# Patient Record
Sex: FEMALE | Race: WHITE | NOT HISPANIC OR LATINO | Employment: OTHER | URBAN - METROPOLITAN AREA
[De-identification: names, ages, dates, MRNs, and addresses within clinical notes are randomized per-mention and may not be internally consistent; named-entity substitution may affect disease eponyms.]

---

## 2017-12-06 ENCOUNTER — HOSPITAL ENCOUNTER (INPATIENT)
Facility: HOSPITAL | Age: 77
LOS: 2 days | Discharge: HOME | DRG: 872 | End: 2017-12-08
Attending: EMERGENCY MEDICINE | Admitting: INTERNAL MEDICINE
Payer: MEDICARE

## 2017-12-06 DIAGNOSIS — K59.00 CONSTIPATION, UNSPECIFIED CONSTIPATION TYPE: ICD-10-CM

## 2017-12-06 DIAGNOSIS — R79.89 ELEVATED TROPONIN: ICD-10-CM

## 2017-12-06 DIAGNOSIS — N10 PYELONEPHRITIS, ACUTE: Primary | ICD-10-CM

## 2017-12-06 DIAGNOSIS — R10.32 LEFT LOWER QUADRANT PAIN: ICD-10-CM

## 2017-12-06 DIAGNOSIS — R07.9 CHEST PAIN: ICD-10-CM

## 2017-12-06 DIAGNOSIS — R10.9 ABDOMINAL PAIN: ICD-10-CM

## 2017-12-06 DIAGNOSIS — R11.15 INTRACTABLE CYCLICAL VOMITING WITH NAUSEA: ICD-10-CM

## 2017-12-06 LAB
ALBUMIN SERPL BCP-MCNC: 3.5 G/DL
ALP SERPL-CCNC: 66 U/L
ALT SERPL W/O P-5'-P-CCNC: 15 U/L
ANION GAP SERPL CALC-SCNC: 9 MMOL/L
AST SERPL-CCNC: 23 U/L
BACTERIA #/AREA URNS HPF: ABNORMAL /HPF
BASOPHILS NFR BLD: 0 %
BILIRUB SERPL-MCNC: 0.9 MG/DL
BILIRUB UR QL STRIP: NEGATIVE
BUN SERPL-MCNC: 16 MG/DL
CALCIUM SERPL-MCNC: 9.1 MG/DL
CHLORIDE SERPL-SCNC: 103 MMOL/L
CLARITY UR: CLEAR
CO2 SERPL-SCNC: 25 MMOL/L
COLOR UR: YELLOW
CREAT SERPL-MCNC: 0.8 MG/DL
DIFFERENTIAL METHOD: ABNORMAL
EOSINOPHIL NFR BLD: 1 %
ERYTHROCYTE [DISTWIDTH] IN BLOOD BY AUTOMATED COUNT: 13 %
EST. GFR  (AFRICAN AMERICAN): >60 ML/MIN/1.73 M^2
EST. GFR  (NON AFRICAN AMERICAN): >60 ML/MIN/1.73 M^2
GLUCOSE SERPL-MCNC: 151 MG/DL
GLUCOSE UR QL STRIP: NEGATIVE
HCT VFR BLD AUTO: 30.8 %
HGB BLD-MCNC: 10.6 G/DL
HGB UR QL STRIP: ABNORMAL
HYALINE CASTS #/AREA URNS LPF: 0 /LPF
KETONES UR QL STRIP: NEGATIVE
LACTATE SERPL-SCNC: 1.6 MMOL/L
LEUKOCYTE ESTERASE UR QL STRIP: NEGATIVE
LIPASE SERPL-CCNC: 18 U/L
LIPASE SERPL-CCNC: 18 U/L
LYMPHOCYTES NFR BLD: 6 %
MCH RBC QN AUTO: 31.1 PG
MCHC RBC AUTO-ENTMCNC: 34.4 G/DL
MCV RBC AUTO: 90 FL
METAMYELOCYTES NFR BLD MANUAL: 1 %
MICROSCOPIC COMMENT: ABNORMAL
MONOCYTES NFR BLD: 4 %
NEUTROPHILS NFR BLD: 87 %
NEUTS BAND NFR BLD MANUAL: 1 %
NITRITE UR QL STRIP: NEGATIVE
PH UR STRIP: 6 [PH] (ref 5–8)
PLATELET # BLD AUTO: 239 K/UL
PMV BLD AUTO: 10.5 FL
POCT GLUCOSE: 163 MG/DL (ref 70–110)
POTASSIUM SERPL-SCNC: 3.5 MMOL/L
PROT SERPL-MCNC: 7.1 G/DL
PROT UR QL STRIP: ABNORMAL
RBC # BLD AUTO: 3.41 M/UL
RBC #/AREA URNS HPF: 2 /HPF (ref 0–4)
SODIUM SERPL-SCNC: 137 MMOL/L
SP GR UR STRIP: 1.01 (ref 1–1.03)
SQUAMOUS #/AREA URNS HPF: 2 /HPF
TROPONIN I SERPL DL<=0.01 NG/ML-MCNC: 0.03 NG/ML
URN SPEC COLLECT METH UR: ABNORMAL
UROBILINOGEN UR STRIP-ACNC: NEGATIVE EU/DL
WBC # BLD AUTO: 19.79 K/UL
WBC #/AREA URNS HPF: 1 /HPF (ref 0–5)

## 2017-12-06 PROCEDURE — 81000 URINALYSIS NONAUTO W/SCOPE: CPT

## 2017-12-06 PROCEDURE — 85007 BL SMEAR W/DIFF WBC COUNT: CPT

## 2017-12-06 PROCEDURE — 80053 COMPREHEN METABOLIC PANEL: CPT

## 2017-12-06 PROCEDURE — 99285 EMERGENCY DEPT VISIT HI MDM: CPT | Mod: 25

## 2017-12-06 PROCEDURE — 93005 ELECTROCARDIOGRAM TRACING: CPT

## 2017-12-06 PROCEDURE — 63600175 PHARM REV CODE 636 W HCPCS: Performed by: EMERGENCY MEDICINE

## 2017-12-06 PROCEDURE — 25000003 PHARM REV CODE 250: Performed by: EMERGENCY MEDICINE

## 2017-12-06 PROCEDURE — 83690 ASSAY OF LIPASE: CPT | Mod: 91

## 2017-12-06 PROCEDURE — 96361 HYDRATE IV INFUSION ADD-ON: CPT

## 2017-12-06 PROCEDURE — 96365 THER/PROPH/DIAG IV INF INIT: CPT | Mod: 59

## 2017-12-06 PROCEDURE — 85027 COMPLETE CBC AUTOMATED: CPT

## 2017-12-06 PROCEDURE — 87040 BLOOD CULTURE FOR BACTERIA: CPT | Mod: 59

## 2017-12-06 PROCEDURE — 12000002 HC ACUTE/MED SURGE SEMI-PRIVATE ROOM

## 2017-12-06 PROCEDURE — 84484 ASSAY OF TROPONIN QUANT: CPT

## 2017-12-06 PROCEDURE — 87088 URINE BACTERIA CULTURE: CPT

## 2017-12-06 PROCEDURE — 83690 ASSAY OF LIPASE: CPT

## 2017-12-06 PROCEDURE — 87186 SC STD MICRODIL/AGAR DIL: CPT

## 2017-12-06 PROCEDURE — 83605 ASSAY OF LACTIC ACID: CPT

## 2017-12-06 PROCEDURE — 96375 TX/PRO/DX INJ NEW DRUG ADDON: CPT

## 2017-12-06 PROCEDURE — 87086 URINE CULTURE/COLONY COUNT: CPT

## 2017-12-06 PROCEDURE — 93010 ELECTROCARDIOGRAM REPORT: CPT | Mod: ,,, | Performed by: INTERNAL MEDICINE

## 2017-12-06 PROCEDURE — 25000003 PHARM REV CODE 250: Performed by: PHYSICIAN ASSISTANT

## 2017-12-06 PROCEDURE — 93010 ELECTROCARDIOGRAM REPORT: CPT | Mod: 76,,, | Performed by: INTERNAL MEDICINE

## 2017-12-06 PROCEDURE — 25500020 PHARM REV CODE 255: Performed by: EMERGENCY MEDICINE

## 2017-12-06 PROCEDURE — 87077 CULTURE AEROBIC IDENTIFY: CPT

## 2017-12-06 RX ORDER — SODIUM CHLORIDE 0.9 % (FLUSH) 0.9 %
3 SYRINGE (ML) INJECTION
Status: DISCONTINUED | OUTPATIENT
Start: 2017-12-06 | End: 2017-12-07

## 2017-12-06 RX ORDER — NAPROXEN SODIUM 220 MG/1
81 TABLET, FILM COATED ORAL
Status: COMPLETED | OUTPATIENT
Start: 2017-12-06 | End: 2017-12-06

## 2017-12-06 RX ORDER — ACETAMINOPHEN 650 MG/1
650 SUPPOSITORY RECTAL
Status: COMPLETED | OUTPATIENT
Start: 2017-12-06 | End: 2017-12-06

## 2017-12-06 RX ORDER — IBUPROFEN 200 MG
24 TABLET ORAL
Status: DISCONTINUED | OUTPATIENT
Start: 2017-12-06 | End: 2017-12-08 | Stop reason: HOSPADM

## 2017-12-06 RX ORDER — LOSARTAN POTASSIUM 25 MG/1
25 TABLET ORAL DAILY
COMMUNITY

## 2017-12-06 RX ORDER — SODIUM CHLORIDE 9 MG/ML
1000 INJECTION, SOLUTION INTRAVENOUS
Status: COMPLETED | OUTPATIENT
Start: 2017-12-06 | End: 2017-12-06

## 2017-12-06 RX ORDER — CARVEDILOL 3.12 MG/1
3.12 TABLET ORAL 2 TIMES DAILY WITH MEALS
COMMUNITY

## 2017-12-06 RX ORDER — ONDANSETRON 4 MG/1
4 TABLET, ORALLY DISINTEGRATING ORAL
Status: COMPLETED | OUTPATIENT
Start: 2017-12-06 | End: 2017-12-06

## 2017-12-06 RX ORDER — MORPHINE SULFATE 8 MG/ML
2 INJECTION INTRAMUSCULAR; INTRAVENOUS; SUBCUTANEOUS
Status: COMPLETED | OUTPATIENT
Start: 2017-12-06 | End: 2017-12-06

## 2017-12-06 RX ORDER — SODIUM CHLORIDE 9 MG/ML
INJECTION, SOLUTION INTRAVENOUS CONTINUOUS
Status: DISCONTINUED | OUTPATIENT
Start: 2017-12-06 | End: 2017-12-07

## 2017-12-06 RX ORDER — ONDANSETRON 2 MG/ML
4 INJECTION INTRAMUSCULAR; INTRAVENOUS EVERY 8 HOURS PRN
Status: DISCONTINUED | OUTPATIENT
Start: 2017-12-06 | End: 2017-12-07

## 2017-12-06 RX ORDER — IBUPROFEN 600 MG/1
600 TABLET ORAL EVERY 6 HOURS PRN
Status: DISCONTINUED | OUTPATIENT
Start: 2017-12-06 | End: 2017-12-07

## 2017-12-06 RX ORDER — ATORVASTATIN CALCIUM 20 MG/1
20 TABLET, FILM COATED ORAL DAILY
COMMUNITY

## 2017-12-06 RX ORDER — GLUCAGON 1 MG
1 KIT INJECTION
Status: DISCONTINUED | OUTPATIENT
Start: 2017-12-06 | End: 2017-12-08 | Stop reason: HOSPADM

## 2017-12-06 RX ORDER — ONDANSETRON 2 MG/ML
4 INJECTION INTRAMUSCULAR; INTRAVENOUS
Status: COMPLETED | OUTPATIENT
Start: 2017-12-06 | End: 2017-12-06

## 2017-12-06 RX ORDER — IBUPROFEN 200 MG
16 TABLET ORAL
Status: DISCONTINUED | OUTPATIENT
Start: 2017-12-06 | End: 2017-12-08 | Stop reason: HOSPADM

## 2017-12-06 RX ORDER — ASPIRIN 81 MG/1
81 TABLET ORAL DAILY
COMMUNITY

## 2017-12-06 RX ORDER — ASPIRIN 81 MG/1
81 TABLET ORAL DAILY
Status: DISCONTINUED | OUTPATIENT
Start: 2017-12-07 | End: 2017-12-08 | Stop reason: HOSPADM

## 2017-12-06 RX ORDER — HYDROGEN PEROXIDE 3 %
20 SOLUTION, NON-ORAL MISCELLANEOUS
COMMUNITY

## 2017-12-06 RX ADMIN — CEFTRIAXONE 1 G: 1 INJECTION, SOLUTION INTRAVENOUS at 09:12

## 2017-12-06 RX ADMIN — MORPHINE SULFATE 2 MG: 8 INJECTION, SOLUTION INTRAMUSCULAR; INTRAVENOUS at 06:12

## 2017-12-06 RX ADMIN — IOHEXOL 75 ML: 350 INJECTION, SOLUTION INTRAVENOUS at 06:12

## 2017-12-06 RX ADMIN — SODIUM CHLORIDE 1000 ML: 0.9 INJECTION, SOLUTION INTRAVENOUS at 05:12

## 2017-12-06 RX ADMIN — NITROGLYCERIN 0.5 INCH: 20 OINTMENT TOPICAL at 09:12

## 2017-12-06 RX ADMIN — IOHEXOL 15 ML: 300 INJECTION, SOLUTION INTRAVENOUS at 06:12

## 2017-12-06 RX ADMIN — ONDANSETRON 4 MG: 4 TABLET, ORALLY DISINTEGRATING ORAL at 03:12

## 2017-12-06 RX ADMIN — ONDANSETRON 4 MG: 2 INJECTION INTRAMUSCULAR; INTRAVENOUS at 05:12

## 2017-12-06 RX ADMIN — PIPERACILLIN AND TAZOBACTAM 4.5 G: 4; .5 INJECTION, POWDER, LYOPHILIZED, FOR SOLUTION INTRAVENOUS; PARENTERAL at 06:12

## 2017-12-06 RX ADMIN — SODIUM CHLORIDE: 0.9 INJECTION, SOLUTION INTRAVENOUS at 10:12

## 2017-12-06 RX ADMIN — ASPIRIN 81 MG 81 MG: 81 TABLET ORAL at 08:12

## 2017-12-06 RX ADMIN — ACETAMINOPHEN 650 MG: 650 SUPPOSITORY RECTAL at 06:12

## 2017-12-06 NOTE — ED TRIAGE NOTES
Pt comes in complaining of abdominal pain, nausea, emesis which started this afternoon. Pt report intermittent general weakness x 1 wk. Pt reports last normal BM this morning. Pt has hx of cardiac stents and DM.

## 2017-12-06 NOTE — ED PROVIDER NOTES
Encounter Date: 12/6/2017    SCRIBE #1 NOTE: I, Ned Garcia, am scribing for, and in the presence of,  Alonso Guerrero MD. I have scribed the following portions of the note - Other sections scribed: HPI/ROS/PE.       History     Chief Complaint   Patient presents with    Abdominal Pain     abdominal pain with nausea and vomitting; general weakness; symptoms this morning; EMS reports     Fatigue     CC: Fatigue/Generalized Weakness    HPI: This 77 y.o. Female with a medical history of diabetes mellitus, hyperlipidemia, and HTN presents to the ED accompanied by daughter for an evaluation of acute onset fatigue/generalized weakness since 11:00 AM this morning. Per daughter, around 2:30 PM today, patient began to experience nausea and vomiting along with abdominal pain. No abdominal surgeries, but patient did have abdominal complications in the past. Patient has a stent in place. No alleviating or exacerbating factors. Otherwise, daughter denies fever, chills, and numbness.      The history is provided by a relative. No  was used.     Review of patient's allergies indicates:  No Known Allergies  Past Medical History:   Diagnosis Date    Diabetes mellitus     Hyperlipidemia     Hypertension      Past Surgical History:   Procedure Laterality Date    cardiac stents       History reviewed. No pertinent family history.  Social History   Substance Use Topics    Smoking status: Never Smoker    Smokeless tobacco: Never Used    Alcohol use No     Review of Systems   Constitutional: Positive for fatigue. Negative for chills and fever.   HENT: Negative for congestion, ear pain, rhinorrhea and sore throat.    Eyes: Negative for pain and visual disturbance.   Respiratory: Negative for cough and shortness of breath.    Cardiovascular: Negative for chest pain.   Gastrointestinal: Positive for abdominal pain, nausea and vomiting. Negative for diarrhea.   Genitourinary: Negative for dysuria.    Musculoskeletal: Negative for back pain and neck pain.   Skin: Negative for rash.   Neurological: Positive for weakness (generalized). Negative for numbness and headaches.       Physical Exam     Initial Vitals [12/06/17 1519]   BP Pulse Resp Temp SpO2   (!) 174/87 (!) 122 18 99.5 °F (37.5 °C) (!) 94 %      MAP       116         Physical Exam    Nursing note and vitals reviewed.  Constitutional: She appears well-developed and well-nourished.  Non-toxic appearance. She does not appear ill.   HENT:   Head: Normocephalic and atraumatic.   Eyes: EOM are normal.   Neck: Neck supple.   Cardiovascular: Normal rate and regular rhythm.   Pulmonary/Chest: Effort normal and breath sounds normal. No respiratory distress.   Abdominal: Soft. Normal appearance and bowel sounds are normal. She exhibits no distension. There is tenderness (LLQ). There is guarding (mild).   Musculoskeletal: Normal range of motion.   Neurological: She is alert.   Skin: Skin is warm and dry.   Psychiatric: She has a normal mood and affect.         ED Course   Procedures  Labs Reviewed   CBC W/ AUTO DIFFERENTIAL - Abnormal; Notable for the following:        Result Value    WBC 19.79 (*)     RBC 3.41 (*)     Hemoglobin 10.6 (*)     Hematocrit 30.8 (*)     MCH 31.1 (*)     Gran% 87.0 (*)     Lymph% 6.0 (*)     All other components within normal limits   COMPREHENSIVE METABOLIC PANEL - Abnormal; Notable for the following:     Glucose 151 (*)     All other components within normal limits   URINALYSIS - Abnormal; Notable for the following:     Protein, UA 1+ (*)     Occult Blood UA 1+ (*)     All other components within normal limits   TROPONIN I - Abnormal; Notable for the following:     Troponin I 0.033 (*)     All other components within normal limits   URINALYSIS MICROSCOPIC - Abnormal; Notable for the following:     Bacteria, UA Many (*)     All other components within normal limits   POCT GLUCOSE - Abnormal; Notable for the following:     POCT Glucose  163 (*)     All other components within normal limits   CULTURE, BLOOD   CULTURE, BLOOD   CULTURE, URINE   LIPASE   LIPASE   LACTIC ACID, PLASMA   URINALYSIS   URINALYSIS   POCT GLUCOSE MONITORING CONTINUOUS   POCT GLUCOSE MONITORING CONTINUOUS     EKG Readings: (Independently Interpreted)   Initial Reading: No STEMI. Rhythm: Sinus Tachycardia. Heart Rate: 112. ST Segments: Non-Specific ST Segment Depression.     ECG Results          EKG 12-lead (Final result)  Result time 12/06/17 17:24:18    Final result by Interface, Lab In [facility] (12/06/17 17:24:18)                 Narrative:    Test Reason : R10.9  Blood Pressure : 139/62mmHG  Vent. Rate : 115 BPM     Atrial Rate : 115 BPM     P-R Int : 140 ms          QRS Dur : 084 ms      QT Int : 292 ms       P-R-T Axes : 073 038 035 degrees     QTc Int : 403 ms    Sinus tachycardia with occasional Premature ventricular complexes  Nonspecific ST and T wave abnormality  Abnormal ECG  No previous ECGs available  Confirmed by Uri Gayle MD ([id number]) on 12/6/2017 5:24:12 PM    Referred By: GUSTAVO CHACON           Confirmed By:Uri Gayle MD                                          Scribe Attestation:   Scribe #1: I performed the above scribed service and the documentation accurately describes the services I performed. I attest to the accuracy of the note.    Attending Attestation:           Physician Attestation for Scribe:  Physician Attestation Statement for Scribe #1: I, Alonso Guerrero MD, reviewed documentation, as scribed by Ned Garcia in my presence, and it is both accurate and complete.                 ED Course    MDM Pt with decreased pain, now afebrile, tolerating pos now. Denies chest pain. CT indicates left pyelonephrits, though urinalyis is clean, pt does have left flank and left lower abd pain, more consistant with divereticulitis. Will continue on broad spectrum antibiotics. Elevated troponin is slight and not related to subj complaints or ekg changes,  most likely related to tachycardic stress with treat with ASA only, unless admit physician disagrees.  Clinical Impression:   The primary encounter diagnosis was Pyelonephritis, acute. Diagnoses of Abdominal pain, Chest pain, Left lower quadrant pain, Constipation, unspecified constipation type, Intractable cyclical vomiting with nausea, Elevated troponin, and Elevated troponin were also pertinent to this visit.                           Alonso Guerrero MD  12/06/17 1946

## 2017-12-07 PROBLEM — I10 ESSENTIAL HYPERTENSION: Chronic | Status: ACTIVE | Noted: 2017-12-07

## 2017-12-07 PROBLEM — I25.10 CAD (CORONARY ARTERY DISEASE): Chronic | Status: ACTIVE | Noted: 2017-12-07

## 2017-12-07 PROBLEM — A41.9 SEPSIS: Status: ACTIVE | Noted: 2017-12-07

## 2017-12-07 PROBLEM — R65.20 SEVERE SEPSIS: Status: ACTIVE | Noted: 2017-12-07

## 2017-12-07 PROBLEM — K21.9 GERD (GASTROESOPHAGEAL REFLUX DISEASE): Chronic | Status: ACTIVE | Noted: 2017-12-07

## 2017-12-07 PROBLEM — E11.9 TYPE 2 DIABETES MELLITUS: Chronic | Status: ACTIVE | Noted: 2017-12-07

## 2017-12-07 PROBLEM — E78.5 HYPERLIPIDEMIA: Chronic | Status: ACTIVE | Noted: 2017-12-07

## 2017-12-07 PROBLEM — R07.9 ACUTE CHEST PAIN: Status: ACTIVE | Noted: 2017-12-07

## 2017-12-07 PROBLEM — D63.8 ANEMIA OF CHRONIC DISEASE: Chronic | Status: ACTIVE | Noted: 2017-12-07

## 2017-12-07 LAB
ALBUMIN SERPL BCP-MCNC: 2.9 G/DL
ALP SERPL-CCNC: 57 U/L
ALT SERPL W/O P-5'-P-CCNC: 15 U/L
ANION GAP SERPL CALC-SCNC: 11 MMOL/L
AST SERPL-CCNC: 20 U/L
BASOPHILS # BLD AUTO: 0.04 K/UL
BASOPHILS NFR BLD: 0.2 %
BILIRUB SERPL-MCNC: 0.7 MG/DL
BUN SERPL-MCNC: 11 MG/DL
CALCIUM SERPL-MCNC: 8.2 MG/DL
CHLORIDE SERPL-SCNC: 108 MMOL/L
CO2 SERPL-SCNC: 20 MMOL/L
CREAT SERPL-MCNC: 0.8 MG/DL
DIASTOLIC DYSFUNCTION: YES
DIFFERENTIAL METHOD: ABNORMAL
EOSINOPHIL # BLD AUTO: 0.1 K/UL
EOSINOPHIL NFR BLD: 0.3 %
ERYTHROCYTE [DISTWIDTH] IN BLOOD BY AUTOMATED COUNT: 13.4 %
EST. GFR  (AFRICAN AMERICAN): >60 ML/MIN/1.73 M^2
EST. GFR  (NON AFRICAN AMERICAN): >60 ML/MIN/1.73 M^2
ESTIMATED AVG GLUCOSE: 128 MG/DL
ESTIMATED PA SYSTOLIC PRESSURE: 32.81
GLOBAL PERICARDIAL EFFUSION: ABNORMAL
GLUCOSE SERPL-MCNC: 112 MG/DL
HBA1C MFR BLD HPLC: 6.1 %
HCT VFR BLD AUTO: 27.8 %
HGB BLD-MCNC: 9.3 G/DL
LYMPHOCYTES # BLD AUTO: 2.7 K/UL
LYMPHOCYTES NFR BLD: 11.7 %
MAGNESIUM SERPL-MCNC: 1.4 MG/DL
MCH RBC QN AUTO: 30.6 PG
MCHC RBC AUTO-ENTMCNC: 33.5 G/DL
MCV RBC AUTO: 91 FL
MITRAL VALVE REGURGITATION: ABNORMAL
MONOCYTES # BLD AUTO: 2.1 K/UL
MONOCYTES NFR BLD: 9.1 %
NEUTROPHILS # BLD AUTO: 17.8 K/UL
NEUTROPHILS NFR BLD: 79.1 %
PHOSPHATE SERPL-MCNC: 2.7 MG/DL
PLATELET # BLD AUTO: 178 K/UL
PMV BLD AUTO: 11.8 FL
POCT GLUCOSE: 139 MG/DL (ref 70–110)
POCT GLUCOSE: 141 MG/DL (ref 70–110)
POCT GLUCOSE: 143 MG/DL (ref 70–110)
POCT GLUCOSE: 169 MG/DL (ref 70–110)
POCT GLUCOSE: 188 MG/DL (ref 70–110)
POCT GLUCOSE: 230 MG/DL (ref 70–110)
POTASSIUM SERPL-SCNC: 4 MMOL/L
PROT SERPL-MCNC: 6.3 G/DL
RBC # BLD AUTO: 3.04 M/UL
RETIRED EF AND QEF - SEE NOTES: 55 (ref 55–65)
SODIUM SERPL-SCNC: 139 MMOL/L
TRICUSPID VALVE REGURGITATION: ABNORMAL
TROPONIN I SERPL DL<=0.01 NG/ML-MCNC: 0.03 NG/ML
TROPONIN I SERPL DL<=0.01 NG/ML-MCNC: 0.03 NG/ML
TROPONIN I SERPL DL<=0.01 NG/ML-MCNC: 0.04 NG/ML
WBC # BLD AUTO: 22.59 K/UL

## 2017-12-07 PROCEDURE — 25000003 PHARM REV CODE 250: Performed by: INTERNAL MEDICINE

## 2017-12-07 PROCEDURE — 85025 COMPLETE CBC W/AUTO DIFF WBC: CPT

## 2017-12-07 PROCEDURE — 36415 COLL VENOUS BLD VENIPUNCTURE: CPT

## 2017-12-07 PROCEDURE — 93306 TTE W/DOPPLER COMPLETE: CPT | Mod: 26,,, | Performed by: INTERNAL MEDICINE

## 2017-12-07 PROCEDURE — 63600175 PHARM REV CODE 636 W HCPCS: Performed by: EMERGENCY MEDICINE

## 2017-12-07 PROCEDURE — 12000002 HC ACUTE/MED SURGE SEMI-PRIVATE ROOM

## 2017-12-07 PROCEDURE — 84484 ASSAY OF TROPONIN QUANT: CPT | Mod: 91

## 2017-12-07 PROCEDURE — 25000003 PHARM REV CODE 250: Performed by: HOSPITALIST

## 2017-12-07 PROCEDURE — 84484 ASSAY OF TROPONIN QUANT: CPT

## 2017-12-07 PROCEDURE — 99223 1ST HOSP IP/OBS HIGH 75: CPT | Mod: ,,, | Performed by: INTERNAL MEDICINE

## 2017-12-07 PROCEDURE — 80053 COMPREHEN METABOLIC PANEL: CPT

## 2017-12-07 PROCEDURE — 83735 ASSAY OF MAGNESIUM: CPT

## 2017-12-07 PROCEDURE — 84100 ASSAY OF PHOSPHORUS: CPT

## 2017-12-07 PROCEDURE — 93306 TTE W/DOPPLER COMPLETE: CPT

## 2017-12-07 PROCEDURE — 83036 HEMOGLOBIN GLYCOSYLATED A1C: CPT

## 2017-12-07 PROCEDURE — 25000003 PHARM REV CODE 250: Performed by: EMERGENCY MEDICINE

## 2017-12-07 PROCEDURE — 63600175 PHARM REV CODE 636 W HCPCS: Performed by: INTERNAL MEDICINE

## 2017-12-07 RX ORDER — ATORVASTATIN CALCIUM 10 MG/1
20 TABLET, FILM COATED ORAL DAILY
Status: DISCONTINUED | OUTPATIENT
Start: 2017-12-07 | End: 2017-12-08 | Stop reason: HOSPADM

## 2017-12-07 RX ORDER — RAMELTEON 8 MG/1
8 TABLET ORAL NIGHTLY PRN
Status: DISCONTINUED | OUTPATIENT
Start: 2017-12-07 | End: 2017-12-08 | Stop reason: HOSPADM

## 2017-12-07 RX ORDER — TRAMADOL HYDROCHLORIDE 50 MG/1
50 TABLET ORAL EVERY 6 HOURS PRN
Status: DISCONTINUED | OUTPATIENT
Start: 2017-12-07 | End: 2017-12-08 | Stop reason: HOSPADM

## 2017-12-07 RX ORDER — CARVEDILOL 6.25 MG/1
25 TABLET ORAL 2 TIMES DAILY WITH MEALS
Status: DISCONTINUED | OUTPATIENT
Start: 2017-12-07 | End: 2017-12-08

## 2017-12-07 RX ORDER — ACETAMINOPHEN 500 MG
500 TABLET ORAL EVERY 6 HOURS PRN
Status: DISCONTINUED | OUTPATIENT
Start: 2017-12-07 | End: 2017-12-08 | Stop reason: HOSPADM

## 2017-12-07 RX ORDER — PANTOPRAZOLE SODIUM 40 MG/1
40 TABLET, DELAYED RELEASE ORAL DAILY
Status: DISCONTINUED | OUTPATIENT
Start: 2017-12-07 | End: 2017-12-08 | Stop reason: HOSPADM

## 2017-12-07 RX ORDER — INSULIN ASPART 100 [IU]/ML
3 INJECTION, SOLUTION INTRAVENOUS; SUBCUTANEOUS
Status: DISCONTINUED | OUTPATIENT
Start: 2017-12-07 | End: 2017-12-08 | Stop reason: HOSPADM

## 2017-12-07 RX ORDER — CARVEDILOL 3.12 MG/1
3.12 TABLET ORAL 2 TIMES DAILY WITH MEALS
Status: DISCONTINUED | OUTPATIENT
Start: 2017-12-07 | End: 2017-12-07

## 2017-12-07 RX ORDER — ONDANSETRON 2 MG/ML
8 INJECTION INTRAMUSCULAR; INTRAVENOUS EVERY 8 HOURS PRN
Status: DISCONTINUED | OUTPATIENT
Start: 2017-12-07 | End: 2017-12-08 | Stop reason: HOSPADM

## 2017-12-07 RX ORDER — NITROGLYCERIN 0.4 MG/1
0.4 TABLET SUBLINGUAL EVERY 5 MIN PRN
Status: DISCONTINUED | OUTPATIENT
Start: 2017-12-07 | End: 2017-12-08 | Stop reason: HOSPADM

## 2017-12-07 RX ORDER — CLONIDINE HYDROCHLORIDE 0.1 MG/1
0.1 TABLET ORAL 3 TIMES DAILY PRN
Status: DISCONTINUED | OUTPATIENT
Start: 2017-12-07 | End: 2017-12-08 | Stop reason: HOSPADM

## 2017-12-07 RX ORDER — MORPHINE SULFATE 8 MG/ML
2 INJECTION INTRAMUSCULAR; INTRAVENOUS; SUBCUTANEOUS EVERY 4 HOURS PRN
Status: DISCONTINUED | OUTPATIENT
Start: 2017-12-07 | End: 2017-12-08 | Stop reason: HOSPADM

## 2017-12-07 RX ORDER — PROCHLORPERAZINE EDISYLATE 5 MG/ML
5 INJECTION INTRAMUSCULAR; INTRAVENOUS EVERY 6 HOURS PRN
Status: DISCONTINUED | OUTPATIENT
Start: 2017-12-07 | End: 2017-12-08 | Stop reason: HOSPADM

## 2017-12-07 RX ORDER — LOSARTAN POTASSIUM 25 MG/1
25 TABLET ORAL DAILY
Status: DISCONTINUED | OUTPATIENT
Start: 2017-12-07 | End: 2017-12-08 | Stop reason: HOSPADM

## 2017-12-07 RX ORDER — SODIUM CHLORIDE 9 MG/ML
INJECTION, SOLUTION INTRAVENOUS CONTINUOUS
Status: ACTIVE | OUTPATIENT
Start: 2017-12-07 | End: 2017-12-08

## 2017-12-07 RX ORDER — TRAMADOL HYDROCHLORIDE 50 MG/1
50 TABLET ORAL EVERY 6 HOURS PRN
Status: DISCONTINUED | OUTPATIENT
Start: 2017-12-07 | End: 2017-12-07

## 2017-12-07 RX ADMIN — SODIUM CHLORIDE: 0.9 INJECTION, SOLUTION INTRAVENOUS at 03:12

## 2017-12-07 RX ADMIN — INSULIN ASPART 3 UNITS: 100 INJECTION, SOLUTION INTRAVENOUS; SUBCUTANEOUS at 08:12

## 2017-12-07 RX ADMIN — SODIUM CHLORIDE: 0.9 INJECTION, SOLUTION INTRAVENOUS at 06:12

## 2017-12-07 RX ADMIN — CEFTRIAXONE 1 G: 1 INJECTION, SOLUTION INTRAVENOUS at 09:12

## 2017-12-07 RX ADMIN — INSULIN ASPART 3 UNITS: 100 INJECTION, SOLUTION INTRAVENOUS; SUBCUTANEOUS at 01:12

## 2017-12-07 RX ADMIN — ASPIRIN 81 MG: 81 TABLET, COATED ORAL at 08:12

## 2017-12-07 RX ADMIN — NITROGLYCERIN 0.4 MG: 0.4 TABLET SUBLINGUAL at 10:12

## 2017-12-07 RX ADMIN — CARVEDILOL 25 MG: 6.25 TABLET, FILM COATED ORAL at 09:12

## 2017-12-07 RX ADMIN — ACETAMINOPHEN 500 MG: 500 TABLET ORAL at 11:12

## 2017-12-07 RX ADMIN — LOSARTAN POTASSIUM 25 MG: 25 TABLET, FILM COATED ORAL at 08:12

## 2017-12-07 RX ADMIN — MORPHINE SULFATE 2 MG: 8 INJECTION, SOLUTION INTRAMUSCULAR; INTRAVENOUS at 10:12

## 2017-12-07 RX ADMIN — INSULIN ASPART 3 UNITS: 100 INJECTION, SOLUTION INTRAVENOUS; SUBCUTANEOUS at 06:12

## 2017-12-07 RX ADMIN — TRAMADOL HYDROCHLORIDE 50 MG: 50 TABLET, FILM COATED ORAL at 06:12

## 2017-12-07 RX ADMIN — ACETAMINOPHEN 500 MG: 500 TABLET ORAL at 04:12

## 2017-12-07 RX ADMIN — ATORVASTATIN CALCIUM 20 MG: 10 TABLET, FILM COATED ORAL at 08:12

## 2017-12-07 RX ADMIN — INSULIN DETEMIR 10 UNITS: 100 INJECTION, SOLUTION SUBCUTANEOUS at 09:12

## 2017-12-07 RX ADMIN — CARVEDILOL 3.12 MG: 3.12 TABLET, FILM COATED ORAL at 08:12

## 2017-12-07 RX ADMIN — CARVEDILOL 3.12 MG: 3.12 TABLET, FILM COATED ORAL at 06:12

## 2017-12-07 RX ADMIN — PANTOPRAZOLE SODIUM 40 MG: 40 TABLET, DELAYED RELEASE ORAL at 08:12

## 2017-12-07 NOTE — ASSESSMENT & PLAN NOTE
Patient's blood pressure is well-controlled; will continue home regimen of carvedilol and losartan, and provide as-needed clonidine.

## 2017-12-07 NOTE — CARE UPDATE
Reviewed H and P by my colleague and agree with A and P.  Patient found to have L sided pyelonephritis.  Started on Abx Blood cultures negative.  Urine culture pending.  Follow troponins for CP. All labs and vitals reviewed.

## 2017-12-07 NOTE — HPI
Ms. Navjot Gonzalez is a 77 y.o. female with essential hypertension, hyperlipidemia (LDL unknown), type 2 diabetes mellitus (HbA1c unknown), CAD, anemia of chronic disease, and GERD who presents to [facility] ED with complaints of abdominal pain this morning.  He describes the pain as left-lower abdominal quadrant without radiation and is associated with increased urinary frequency and pain.  She denies any fevers or chills.  Her appetite has been poor.    She does, however, complain of chest pain that is midsternal without radiation.  The pain is 5/10 in severity and is associated with increasing fatigue, shortness of breath, and dizziness.  She does report a history of 2-vessel coronary artery disease but it is unclear if she has ever had any intervention performed.  She was previously receiving her medical care in [place], [place].

## 2017-12-07 NOTE — NURSING
Called to patient's room due to complaints of chest pain. BP: 133/60 HR: 96. Administered to rounds of Nitroglycerin. Pain subsided. Call placed to Dr. Mckinnon to inform him of above. New orders given as follows:    Consult to Cardiology

## 2017-12-07 NOTE — ASSESSMENT & PLAN NOTE
Her story of chest pain is concerning especially given her reported history minimally elevated troponin.  She does complain of pain right now.  Will provide nitroglycerin, place on cardiac monitoring, and obtain serial cardiac markers.  I have reviewed the EKG and it reveals sinus tachycardia without evidence of acute ischemia.  Her chest pain and troponinemia could very well be due to sepsis, but will err on the side of caution.

## 2017-12-07 NOTE — PLAN OF CARE
Problem: Patient Care Overview  Goal: Plan of Care Review  Outcome: Ongoing (interventions implemented as appropriate)  Pt remains free of falls and injuries. Assisted with bed pan as needed. Remains on tele, no issues. IVFs cont as scheduled. Rocephin Q24H. BG WNL. Pain managed with pain meds as needed, states chest pain has lessened. Currently resting appropriately.

## 2017-12-07 NOTE — CONSULTS
[facility]  Cardiology  Consult Note    Patient Name: Navjot Gonzalez  MRN: [id number]  Admission Date: 12/6/2017  Hospital Length of Stay: 1 days  Code Status: Full Code   Attending Provider: Sj Mckinnon MD   Consulting Provider: Uri Glover MD  Primary Care Physician: Provider [facility]  Principal Problem:Pyelonephritis, acute    Patient information was obtained from patient, past medical records and ER records.     Inpatient consult to Cardiology  Consult performed by: URI GLOVER  Consult ordered by: SJ MCKINNON  Reason for consult: Chest pain, history of CAD        Subjective:     Chief Complaint:  Chest pain     HPI:   77 y.o. Female with a medical history of diabetes mellitus, hyperlipidemia, and HTN presents to the ED accompanied by daughter for an evaluation of acute onset fatigue/generalized weakness since 11:00 AM this morning. Per daughter, around 2:30 PM today, patient began to experience nausea and vomiting along with abdominal pain. No abdominal surgeries, but patient did have abdominal complications in the past. Patient has a stent in place. No alleviating or exacerbating factors. Otherwise, daughter denies fever, chills, and numbness.    Admitted for UTI/pyelonephritis.  Patient complained of some mild substernal chest pain.  She's not had on and off symptoms mostly occurs during this admission.  She denies any other associated symptoms.  She's not experience any PND, orthopnea or lower edema.  She denies any dizziness, presyncope or syncope.  Most of the history was obtained per relative who is .  She is in town visiting from [place] where she has had all her work done.  She mostly follows up with primary care physician per family members translating.  She's not any recent testing in particular stress or echo.  Currently she is chest pain-free.    Past Medical History:   Diagnosis Date    Diabetes mellitus     Hyperlipidemia     Hypertension         Past Surgical History:   Procedure Laterality Date    cardiac stents         Review of patient's allergies indicates:  No Known Allergies    No current facility-administered medications on file prior to encounter.      No current outpatient prescriptions on file prior to encounter.     Family History     None        Social History Main Topics    Smoking status: Never Smoker    Smokeless tobacco: Never Used    Alcohol use No    Drug use: Unknown    Sexual activity: Not on file     Review of Systems   Constitution: Negative.   HENT: Negative.    Eyes: Negative.    Cardiovascular: Positive for chest pain. Negative for dyspnea on exertion, irregular heartbeat, leg swelling, near-syncope, orthopnea, palpitations, paroxysmal nocturnal dyspnea and syncope.   Respiratory: Negative for shortness of breath.    Skin: Negative.    Gastrointestinal: Positive for nausea. Negative for abdominal pain, constipation and diarrhea.   Genitourinary: Positive for dysuria and flank pain.   Neurological: Negative.    Psychiatric/Behavioral: Negative.      Objective:     Vital Signs (Most Recent):  Temp: 98.8 °F (37.1 °C) (12/07/17 0816)  Pulse: 94 (12/07/17 0835)  Resp: 18 (12/07/17 0531)  BP: 135/64 (12/07/17 0835)  SpO2: 95 % (12/07/17 0816) Vital Signs (24h Range):  Temp:  [97.5 °F (36.4 °C)-100.3 °F (37.9 °C)] 98.8 °F (37.1 °C)  Pulse:  [] 94  Resp:  [18] 18  SpO2:  [94 %-100 %] 95 %  BP: (107-174)/(52-87) 135/64     Weight: 58 kg (127 lb 13.9 oz)  Body mass index is 27.67 kg/m².    SpO2: 95 %  O2 Device (Oxygen Therapy): room air      Intake/Output Summary (Last 24 hours) at 12/07/17 1131  Last data filed at 12/07/17 0600   Gross per 24 hour   Intake             1300 ml   Output                0 ml   Net             1300 ml       Lines/Drains/Airways     Peripheral Intravenous Line                 Peripheral IV - Single Lumen 12/06/17 1520 Left Hand less than 1 day         Peripheral IV - Single Lumen 12/06/17 3425  Right Antecubital less than 1 day                Physical Exam   Constitutional: She is oriented to person, place, and time. She appears well-developed and well-nourished.   HENT:   Head: Normocephalic and atraumatic.   Eyes: Conjunctivae and EOM are normal. Pupils are equal, round, and reactive to light.   Neck: Normal range of motion. Neck supple. No thyromegaly present.   Cardiovascular: Normal rate and regular rhythm.    No murmur heard.  Pulmonary/Chest: Effort normal and breath sounds normal. No respiratory distress.   Abdominal: Soft. Bowel sounds are normal.   Musculoskeletal: She exhibits no edema.   Neurological: She is alert and oriented to person, place, and time.   Skin: Skin is warm and dry.   Psychiatric: She has a normal mood and affect. Her behavior is normal.       Significant Labs:   CMP   Recent Labs  Lab 12/06/17  1541 12/07/17  0426    139   K 3.5 4.0    108   CO2 25 20*   * 112*   BUN 16 11   CREATININE 0.8 0.8   CALCIUM 9.1 8.2*   PROT 7.1 6.3   ALBUMIN 3.5 2.9*   BILITOT 0.9 0.7   ALKPHOS 66 57   AST 23 20   ALT 15 15   ANIONGAP 9 11   ESTGFRAFRICA >60 >60   EGFRNONAA >60 >60   , CBC   Recent Labs  Lab 12/06/17  1541 12/07/17  0426   WBC 19.79* 22.59*   HGB 10.6* 9.3*   HCT 30.8* 27.8*    178   , INR No results for input(s): INR, PROTIME in the last 48 hours., Lipid Panel No results for input(s): CHOL, HDL, LDLCALC, TRIG, CHOLHDL in the last 48 hours. and Troponin   Recent Labs  Lab 12/06/17  1541 12/07/17  0426   TROPONINI 0.033* 0.027*       Significant Imaging: EKG: Normal sinus rhythm     Assessment and Plan:     * Pyelonephritis, acute    On antibiotics per primary        Acute chest pain    Ruling out for ACS but initial troponin is negative with nonischemic EKG  Continue to follow troponin trend  Check echocardiogram  Consider ischemic workup in a.m. if rules out        CAD (coronary artery disease)    Prior history of PCI  Continue medicines for secondary  prevention        Essential hypertension             Hyperlipidemia    On statin        Type 2 diabetes mellitus    Per IM            VTE Risk Mitigation         Ordered     Medium Risk of VTE  Once      12/06/17 7170          Thank you for your consult. I will follow-up with patient. Please contact us if you have any additional questions.    Uri Gayle MD  Cardiology   [facility]

## 2017-12-07 NOTE — PROGRESS NOTES
SW attempted discharge planning assessment.  Patient stated that she does not speak English well.  Asked SW to speak with her daughter.  Susi Castillo LMSW, [facility], [facility]  12/7/2017

## 2017-12-07 NOTE — ASSESSMENT & PLAN NOTE
Patient is on a home regimen of metformin and a DPP-4 inhibitor; will provide basal-prandial insulin therapy along with insulin sliding scale.

## 2017-12-07 NOTE — PLAN OF CARE
Problem: Patient Care Overview  Goal: Plan of Care Review  Outcome: Ongoing (interventions implemented as appropriate)   12/07/17 3510   Coping/Psychosocial   Plan Of Care Reviewed With patient;daughter     Patient remained free from falls, trauma, and injuries throughout shift. Complaints of chest pain controlled with administration of nitroglycerin. No complaints of nausea or vomiting. Blood glucose controlled with scheduled insulin. IV fluids and medications administered as prescribed. Educated patient and family on being NPO after midnight for testing. No acute distress noted.

## 2017-12-07 NOTE — PROGRESS NOTES
Discharge planning assessment completed with assistance of patient's daughter, Isabel, via [employer]   # [id number] (Sj).  Patient's daughter reported that patient is here visiting for the holidays and will be here until after rob.  Prior to admit, patient lived in [place] with her son and was independent.  Patient's daughter did not know the name of patient's PCP.    She stated that patient see multiple physicians in [place].  Patient will discharge to daughter's home located at [address] [place] [address].  Preferred Pharmacy is [facility] [address].    Susi Castillo LMSW, [facility], CCM  12/7/2017

## 2017-12-07 NOTE — ASSESSMENT & PLAN NOTE
This patient meets criteria for sepsis given fever, tachycardia, leukocytosis, suspected pyelonephritis, and minimally-elevated troponin.  Blood and urine cultures are pending; will start IV fluid hydration and broad spectrum antibiotics.

## 2017-12-07 NOTE — NURSING
Pt arrived to [facility] floor via transport. Daughter was at bedside, assisted with admit questions. Able to speak and communicate with pt in pt's primary language--Jamaican. Skin intact. Assisted with bedpan. C/o 7/10 abd pain, tender upon palpation. IVFs initiated. Pt oriented to room, bed in low position, call light within reach. POC d/w pt and daughter. No other complaints at this time.

## 2017-12-07 NOTE — SUBJECTIVE & OBJECTIVE
Past Medical History:   Diagnosis Date    Diabetes mellitus     Hyperlipidemia     Hypertension        Past Surgical History:   Procedure Laterality Date    cardiac stents         Review of patient's allergies indicates:  No Known Allergies    No current facility-administered medications on file prior to encounter.      No current outpatient prescriptions on file prior to encounter.     Family History     None        Social History Main Topics    Smoking status: Never Smoker    Smokeless tobacco: Never Used    Alcohol use No    Drug use: Unknown    Sexual activity: Not on file     Review of Systems   Constitutional: Positive for activity change and appetite change. Negative for chills, diaphoresis, fatigue, fever and unexpected weight change.   HENT: Negative.    Eyes: Negative.    Respiratory: Negative for cough, chest tightness and shortness of breath.    Cardiovascular: Positive for chest pain. Negative for palpitations and leg swelling.   Gastrointestinal: Positive for abdominal pain. Negative for abdominal distention, blood in stool, constipation, diarrhea, nausea and vomiting.   Genitourinary: Positive for dysuria, frequency and urgency.   Musculoskeletal: Negative.    Skin: Negative.    Neurological: Negative for dizziness, seizures, syncope, weakness and light-headedness.   Psychiatric/Behavioral: Negative.      Objective:     Vital Signs (Most Recent):  Temp: 98.1 °F (36.7 °C) (12/06/17 2234)  Pulse: 105 (12/06/17 2234)  Resp: 18 (12/06/17 2234)  BP: (!) 148/67 (12/06/17 2234)  SpO2: 95 % (12/06/17 2234) Vital Signs (24h Range):  Temp:  [98.1 °F (36.7 °C)-100.3 °F (37.9 °C)] 98.1 °F (36.7 °C)  Pulse:  [102-122] 105  Resp:  [18] 18  SpO2:  [94 %-100 %] 95 %  BP: (138-174)/(63-87) 148/67     Weight: 58 kg (127 lb 13.9 oz)  Body mass index is 27.67 kg/m².    Physical Exam   Constitutional: She is oriented to person, place, and time. She appears well-developed and well-nourished. No distress.   HENT:    Head: Normocephalic and atraumatic.   Right Ear: External ear normal.   Left Ear: External ear normal.   Nose: Nose normal.   Eyes: Right eye exhibits no discharge. Left eye exhibits no discharge.   Neck: Normal range of motion.   Cardiovascular: Normal rate, regular rhythm, normal heart sounds and intact distal pulses.  Exam reveals no gallop and no friction rub.    No murmur heard.  Pulmonary/Chest: Effort normal and breath sounds normal. No respiratory distress. She has no wheezes. She has no rales. She exhibits no tenderness.   Abdominal: Soft. Bowel sounds are normal. She exhibits no distension. There is no tenderness. There is no rebound and no guarding.   Genitourinary:   Genitourinary Comments: Pungent odor of urine   Musculoskeletal: Normal range of motion. She exhibits no edema.   Neurological: She is alert and oriented to person, place, and time.   Skin: Skin is warm and dry. She is not diaphoretic. No erythema.   Psychiatric: She has a normal mood and affect. Her behavior is normal. Judgment and thought content normal.   Nursing note and vitals reviewed.          Significant Labs: All pertinent labs within the past 24 hours have been reviewed.    Significant Imaging: I have reviewed and interpreted all pertinent imaging results/findings within the past 24 hours.

## 2017-12-07 NOTE — ASSESSMENT & PLAN NOTE
As addressed above.  Will continue her home regimen of aspirin, atorvastatin, carvedilol, and losartan.

## 2017-12-07 NOTE — NURSING
Patient off unit to cardiology via wheelchair. No acute distress noted. Will continue to monitor.

## 2017-12-07 NOTE — SUBJECTIVE & OBJECTIVE
Past Medical History:   Diagnosis Date    Diabetes mellitus     Hyperlipidemia     Hypertension        Past Surgical History:   Procedure Laterality Date    cardiac stents         Review of patient's allergies indicates:  No Known Allergies    No current facility-administered medications on file prior to encounter.      No current outpatient prescriptions on file prior to encounter.     Family History     None        Social History Main Topics    Smoking status: Never Smoker    Smokeless tobacco: Never Used    Alcohol use No    Drug use: Unknown    Sexual activity: Not on file     Review of Systems   Constitution: Negative.   HENT: Negative.    Eyes: Negative.    Cardiovascular: Positive for chest pain. Negative for dyspnea on exertion, irregular heartbeat, leg swelling, near-syncope, orthopnea, palpitations, paroxysmal nocturnal dyspnea and syncope.   Respiratory: Negative for shortness of breath.    Skin: Negative.    Gastrointestinal: Positive for nausea. Negative for abdominal pain, constipation and diarrhea.   Genitourinary: Positive for dysuria and flank pain.   Neurological: Negative.    Psychiatric/Behavioral: Negative.      Objective:     Vital Signs (Most Recent):  Temp: 98.8 °F (37.1 °C) (12/07/17 0816)  Pulse: 94 (12/07/17 0835)  Resp: 18 (12/07/17 0531)  BP: 135/64 (12/07/17 0835)  SpO2: 95 % (12/07/17 0816) Vital Signs (24h Range):  Temp:  [97.5 °F (36.4 °C)-100.3 °F (37.9 °C)] 98.8 °F (37.1 °C)  Pulse:  [] 94  Resp:  [18] 18  SpO2:  [94 %-100 %] 95 %  BP: (107-174)/(52-87) 135/64     Weight: 58 kg (127 lb 13.9 oz)  Body mass index is 27.67 kg/m².    SpO2: 95 %  O2 Device (Oxygen Therapy): room air      Intake/Output Summary (Last 24 hours) at 12/07/17 1131  Last data filed at 12/07/17 0600   Gross per 24 hour   Intake             1300 ml   Output                0 ml   Net             1300 ml       Lines/Drains/Airways     Peripheral Intravenous Line                 Peripheral IV -  Single Lumen 12/06/17 1520 Left Hand less than 1 day         Peripheral IV - Single Lumen 12/06/17 1745 Right Antecubital less than 1 day                Physical Exam   Constitutional: She is oriented to person, place, and time. She appears well-developed and well-nourished.   HENT:   Head: Normocephalic and atraumatic.   Eyes: Conjunctivae and EOM are normal. Pupils are equal, round, and reactive to light.   Neck: Normal range of motion. Neck supple. No thyromegaly present.   Cardiovascular: Normal rate and regular rhythm.    No murmur heard.  Pulmonary/Chest: Effort normal and breath sounds normal. No respiratory distress.   Abdominal: Soft. Bowel sounds are normal.   Musculoskeletal: She exhibits no edema.   Neurological: She is alert and oriented to person, place, and time.   Skin: Skin is warm and dry.   Psychiatric: She has a normal mood and affect. Her behavior is normal.       Significant Labs:   CMP   Recent Labs  Lab 12/06/17  1541 12/07/17  0426    139   K 3.5 4.0    108   CO2 25 20*   * 112*   BUN 16 11   CREATININE 0.8 0.8   CALCIUM 9.1 8.2*   PROT 7.1 6.3   ALBUMIN 3.5 2.9*   BILITOT 0.9 0.7   ALKPHOS 66 57   AST 23 20   ALT 15 15   ANIONGAP 9 11   ESTGFRAFRICA >60 >60   EGFRNONAA >60 >60   , CBC   Recent Labs  Lab 12/06/17  1541 12/07/17  0426   WBC 19.79* 22.59*   HGB 10.6* 9.3*   HCT 30.8* 27.8*    178   , INR No results for input(s): INR, PROTIME in the last 48 hours., Lipid Panel No results for input(s): CHOL, HDL, LDLCALC, TRIG, CHOLHDL in the last 48 hours. and Troponin   Recent Labs  Lab 12/06/17  1541 12/07/17  0426   TROPONINI 0.033* 0.027*       Significant Imaging: EKG: Normal sinus rhythm

## 2017-12-07 NOTE — ASSESSMENT & PLAN NOTE
Ruling out for ACS but initial troponin is negative with nonischemic EKG  Continue to follow troponin trend  Check echocardiogram  Consider ischemic workup in a.m. if rules out

## 2017-12-07 NOTE — H&P
[facility] Hospital Medicine  History & Physical    Patient Name: Navjot Gonzalez  MRN: [id number]  Admission Date: 12/6/2017  Attending Physician: Sj Mckinnon MD   Primary Care Provider: Provider Notinsystem         Patient information was obtained from patient.     Subjective:     Principal Problem:Pyelonephritis, acute    Chief Complaint: Abdominal pain for one day.    HPI: Ms. Navjot Gonzalez is a 77 y.o. female with essential hypertension, hyperlipidemia (LDL unknown), type 2 diabetes mellitus (HbA1c unknown), CAD, anemia of chronic disease, and GERD who presents to [facility] ED with complaints of abdominal pain this morning.  He describes the pain as left-lower abdominal quadrant without radiation and is associated with increased urinary frequency and pain.  She denies any fevers or chills.  Her appetite has been poor.    She does, however, complain of chest pain that is midsternal without radiation.  The pain is 5/10 in severity and is associated with increasing fatigue, shortness of breath, and dizziness.  She does report a history of 2-vessel coronary artery disease but it is unclear if she has ever had any intervention performed.  She was previously receiving her medical care in [place], [place].      Chart Review:  Patient has not had any recent hospitalizations or outpatient clinic visits within the system.    Past Medical History:   Diagnosis Date    Diabetes mellitus     Hyperlipidemia     Hypertension        Past Surgical History:   Procedure Laterality Date    cardiac stents         Review of patient's allergies indicates:  No Known Allergies    No current facility-administered medications on file prior to encounter.      No current outpatient prescriptions on file prior to encounter.     Family History     None        Social History Main Topics    Smoking status: Never Smoker    Smokeless tobacco: Never Used    Alcohol use No    Drug use: Unknown    Sexual activity: Not on file      Review of Systems   Constitutional: Positive for activity change and appetite change. Negative for chills, diaphoresis, fatigue, fever and unexpected weight change.   HENT: Negative.    Eyes: Negative.    Respiratory: Negative for cough, chest tightness and shortness of breath.    Cardiovascular: Positive for chest pain. Negative for palpitations and leg swelling.   Gastrointestinal: Positive for abdominal pain. Negative for abdominal distention, blood in stool, constipation, diarrhea, nausea and vomiting.   Genitourinary: Positive for dysuria, frequency and urgency.   Musculoskeletal: Negative.    Skin: Negative.    Neurological: Negative for dizziness, seizures, syncope, weakness and light-headedness.   Psychiatric/Behavioral: Negative.      Objective:     Vital Signs (Most Recent):  Temp: 98.1 °F (36.7 °C) (12/06/17 2234)  Pulse: 105 (12/06/17 2234)  Resp: 18 (12/06/17 2234)  BP: (!) 148/67 (12/06/17 2234)  SpO2: 95 % (12/06/17 2234) Vital Signs (24h Range):  Temp:  [98.1 °F (36.7 °C)-100.3 °F (37.9 °C)] 98.1 °F (36.7 °C)  Pulse:  [102-122] 105  Resp:  [18] 18  SpO2:  [94 %-100 %] 95 %  BP: (138-174)/(63-87) 148/67     Weight: 58 kg (127 lb 13.9 oz)  Body mass index is 27.67 kg/m².    Physical Exam   Constitutional: She is oriented to person, place, and time. She appears well-developed and well-nourished. No distress.   HENT:   Head: Normocephalic and atraumatic.   Right Ear: External ear normal.   Left Ear: External ear normal.   Nose: Nose normal.   Eyes: Right eye exhibits no discharge. Left eye exhibits no discharge.   Neck: Normal range of motion.   Cardiovascular: Normal rate, regular rhythm, normal heart sounds and intact distal pulses.  Exam reveals no gallop and no friction rub.    No murmur heard.  Pulmonary/Chest: Effort normal and breath sounds normal. No respiratory distress. She has no wheezes. She has no rales. She exhibits no tenderness.   Abdominal: Soft. Bowel sounds are normal. She exhibits  no distension. There is no tenderness. There is no rebound and no guarding.   Genitourinary:   Genitourinary Comments: Pungent odor of urine   Musculoskeletal: Normal range of motion. She exhibits no edema.   Neurological: She is alert and oriented to person, place, and time.   Skin: Skin is warm and dry. She is not diaphoretic. No erythema.   Psychiatric: She has a normal mood and affect. Her behavior is normal. Judgment and thought content normal.   Nursing note and vitals reviewed.          Significant Labs: All pertinent labs within the past 24 hours have been reviewed.    Significant Imaging: I have reviewed and interpreted all pertinent imaging results/findings within the past 24 hours.    Assessment/Plan:     * Pyelonephritis, acute    Patient's left-sided flank pain was concerning for acute diverticulitis, but given that her CT-abd/pelvis was mor indicative of left-sided pyelonephritis, and her urinalysis significant only for significant bacteriuria, will start treatment for pyelonephritis.  She does meet criteria for severe sepsis.  Will start empiric antibiotics.        Severe sepsis    This patient meets criteria for sepsis given fever, tachycardia, leukocytosis, suspected pyelonephritis, and minimally-elevated troponin.  Blood and urine cultures are pending; will start IV fluid hydration and broad spectrum antibiotics.        Acute chest pain    Her story of chest pain is concerning especially given her reported history minimally elevated troponin.  She does complain of pain right now.  Will provide nitroglycerin, place on cardiac monitoring, and obtain serial cardiac markers.  I have reviewed the EKG and it reveals sinus tachycardia without evidence of acute ischemia.  Her chest pain and troponinemia could very well be due to sepsis, but will err on the side of caution.        Essential hypertension    Patient's blood pressure is well-controlled; will continue home regimen of carvedilol and losartan,  and provide as-needed clonidine.        Hyperlipidemia    Will continue her home regimen of atorvastatin.        Type 2 diabetes mellitus    Patient is on a home regimen of metformin and a DPP-4 inhibitor; will provide basal-prandial insulin therapy along with insulin sliding scale.        CAD (coronary artery disease)    As addressed above.  Will continue her home regimen of aspirin, atorvastatin, carvedilol, and losartan.        Anemia of chronic disease    Unfortunately, we have no previous labwork to which to compare her anemia, but she is without obvious signs of bleeding; there is no indication for transfusion at this time.  Will continue to monitor.        GERD (gastroesophageal reflux disease)    Will substitute her home regimen of esomeprazole for pantoprazole while she is inpatient.          VTE Risk Mitigation         Ordered     Medium Risk of VTE  Once      12/06/17 2564             Total time spent on case: 45 minutes.        Myesha Mccord M.D.  Staff Nocturnist  Department of Hospital Medicine  [facility]  Pager: [phone]

## 2017-12-07 NOTE — HPI
77 y.o. Female with a medical history of diabetes mellitus, hyperlipidemia, and HTN presents to the ED accompanied by daughter for an evaluation of acute onset fatigue/generalized weakness since 11:00 AM this morning. Per daughter, around 2:30 PM today, patient began to experience nausea and vomiting along with abdominal pain. No abdominal surgeries, but patient did have abdominal complications in the past. Patient has a stent in place. No alleviating or exacerbating factors. Otherwise, daughter denies fever, chills, and numbness.    Admitted for UTI/pyelonephritis.  Patient complained of some mild substernal chest pain.  She's not had on and off symptoms mostly occurs during this admission.  She denies any other associated symptoms.  She's not experience any PND, orthopnea or lower edema.  She denies any dizziness, presyncope or syncope.  Most of the history was obtained per relative who is .  She is in town visiting from [place] where she has had all her work done.  She mostly follows up with primary care physician per family members translating.  She's not any recent testing in particular stress or echo.  Currently she is chest pain-free.

## 2017-12-07 NOTE — PLAN OF CARE
12/07/17 1353   Discharge Assessment   Assessment Type Discharge Planning Assessment   Assessment information obtained from? Caregiver   Communicated expected length of stay with patient/caregiver no   Prior to hospitilization cognitive status: Alert/Oriented   Prior to hospitalization functional status: Independent   Current cognitive status: Alert/Oriented   Current Functional Status: Independent   Lives With child(niru), adult   Able to Return to Prior Arrangements yes   Is patient able to care for self after discharge? Yes   Who are your caregiver(s) and their phone number(s)? Isabel Gonzalez (daughter)  [phone]   Patient's perception of discharge disposition other (comments)  (Daughter's home while visiting until after [place])   Readmission Within The Last 30 Days no previous admission in last 30 days   Patient currently being followed by outpatient case management? No   Patient currently receives any other outside agency services? No   Equipment Currently Used at Home none   Do you have any problems affording any of your prescribed medications? No   Is the patient taking medications as prescribed? yes   Does the patient have transportation home? Yes   Transportation Available family or friend will provide   Discharge Plan A Other;Home with family   Discharge Plan B Home with family   Patient/Family In Agreement With Plan yes   Susi Castillo, SAY, AC[facility], cCM  12/7/2017

## 2017-12-07 NOTE — ASSESSMENT & PLAN NOTE
Unfortunately, we have no previous labwork to which to compare her anemia, but she is without obvious signs of bleeding; there is no indication for transfusion at this time.  Will continue to monitor.

## 2017-12-07 NOTE — ASSESSMENT & PLAN NOTE
Patient's left-sided flank pain was concerning for acute diverticulitis, but given that her CT-abd/pelvis was mor indicative of left-sided pyelonephritis, and her urinalysis significant only for significant bacteriuria, will start treatment for pyelonephritis.  She does meet criteria for severe sepsis.  Will start empiric antibiotics.

## 2017-12-08 VITALS
BODY MASS INDEX: 27.59 KG/M2 | TEMPERATURE: 98 F | HEIGHT: 57 IN | SYSTOLIC BLOOD PRESSURE: 122 MMHG | RESPIRATION RATE: 17 BRPM | DIASTOLIC BLOOD PRESSURE: 56 MMHG | WEIGHT: 127.88 LBS | OXYGEN SATURATION: 99 % | HEART RATE: 62 BPM

## 2017-12-08 PROBLEM — R07.9 ACUTE CHEST PAIN: Status: RESOLVED | Noted: 2017-12-07 | Resolved: 2017-12-08

## 2017-12-08 PROBLEM — R65.20 SEVERE SEPSIS: Status: RESOLVED | Noted: 2017-12-07 | Resolved: 2017-12-08

## 2017-12-08 PROBLEM — A41.9 SEVERE SEPSIS: Status: RESOLVED | Noted: 2017-12-07 | Resolved: 2017-12-08

## 2017-12-08 LAB
BACTERIA UR CULT: NORMAL
BASOPHILS # BLD AUTO: 0.02 K/UL
BASOPHILS NFR BLD: 0.2 %
DIASTOLIC DYSFUNCTION: NO
DIFFERENTIAL METHOD: ABNORMAL
EOSINOPHIL # BLD AUTO: 0.1 K/UL
EOSINOPHIL NFR BLD: 1.1 %
ERYTHROCYTE [DISTWIDTH] IN BLOOD BY AUTOMATED COUNT: 13.4 %
HCT VFR BLD AUTO: 24.5 %
HGB BLD-MCNC: 8.2 G/DL
LYMPHOCYTES # BLD AUTO: 2.3 K/UL
LYMPHOCYTES NFR BLD: 21.8 %
MAGNESIUM SERPL-MCNC: 1.5 MG/DL
MCH RBC QN AUTO: 30.6 PG
MCHC RBC AUTO-ENTMCNC: 33.5 G/DL
MCV RBC AUTO: 91 FL
MONOCYTES # BLD AUTO: 1.1 K/UL
MONOCYTES NFR BLD: 10.8 %
NEUTROPHILS # BLD AUTO: 6.9 K/UL
NEUTROPHILS NFR BLD: 66.1 %
PLATELET # BLD AUTO: 178 K/UL
PMV BLD AUTO: 11.3 FL
POCT GLUCOSE: 123 MG/DL (ref 70–110)
POCT GLUCOSE: 167 MG/DL (ref 70–110)
POCT GLUCOSE: 204 MG/DL (ref 70–110)
RBC # BLD AUTO: 2.68 M/UL
TROPONIN I SERPL DL<=0.01 NG/ML-MCNC: 0.03 NG/ML
TROPONIN I SERPL DL<=0.01 NG/ML-MCNC: 0.03 NG/ML
WBC # BLD AUTO: 10.46 K/UL

## 2017-12-08 PROCEDURE — 63600175 PHARM REV CODE 636 W HCPCS: Performed by: INTERNAL MEDICINE

## 2017-12-08 PROCEDURE — 63600175 PHARM REV CODE 636 W HCPCS

## 2017-12-08 PROCEDURE — 78452 HT MUSCLE IMAGE SPECT MULT: CPT | Mod: 26,,, | Performed by: INTERNAL MEDICINE

## 2017-12-08 PROCEDURE — 25000003 PHARM REV CODE 250: Performed by: INTERNAL MEDICINE

## 2017-12-08 PROCEDURE — 99232 SBSQ HOSP IP/OBS MODERATE 35: CPT | Mod: 25,ICN,, | Performed by: INTERNAL MEDICINE

## 2017-12-08 PROCEDURE — 93018 CV STRESS TEST I&R ONLY: CPT | Mod: ,,, | Performed by: INTERNAL MEDICINE

## 2017-12-08 PROCEDURE — 25000003 PHARM REV CODE 250: Performed by: EMERGENCY MEDICINE

## 2017-12-08 PROCEDURE — 83735 ASSAY OF MAGNESIUM: CPT

## 2017-12-08 PROCEDURE — 36415 COLL VENOUS BLD VENIPUNCTURE: CPT

## 2017-12-08 PROCEDURE — 93017 CV STRESS TEST TRACING ONLY: CPT

## 2017-12-08 PROCEDURE — 84484 ASSAY OF TROPONIN QUANT: CPT

## 2017-12-08 PROCEDURE — 85025 COMPLETE CBC W/AUTO DIFF WBC: CPT

## 2017-12-08 PROCEDURE — 93016 CV STRESS TEST SUPVJ ONLY: CPT | Mod: ,,, | Performed by: INTERNAL MEDICINE

## 2017-12-08 PROCEDURE — 25000003 PHARM REV CODE 250: Performed by: HOSPITALIST

## 2017-12-08 RX ORDER — MAGNESIUM SULFATE HEPTAHYDRATE 40 MG/ML
2 INJECTION, SOLUTION INTRAVENOUS ONCE
Status: COMPLETED | OUTPATIENT
Start: 2017-12-08 | End: 2017-12-08

## 2017-12-08 RX ORDER — CIPROFLOXACIN 500 MG/1
500 TABLET ORAL EVERY 12 HOURS
Qty: 24 TABLET | Refills: 0 | Status: SHIPPED | OUTPATIENT
Start: 2017-12-08 | End: 2017-12-20

## 2017-12-08 RX ORDER — CIPROFLOXACIN 500 MG/1
500 TABLET ORAL EVERY 12 HOURS
Status: DISCONTINUED | OUTPATIENT
Start: 2017-12-08 | End: 2017-12-08 | Stop reason: HOSPADM

## 2017-12-08 RX ORDER — REGADENOSON 0.08 MG/ML
INJECTION, SOLUTION INTRAVENOUS
Status: DISCONTINUED
Start: 2017-12-08 | End: 2017-12-08 | Stop reason: HOSPADM

## 2017-12-08 RX ORDER — CARVEDILOL 6.25 MG/1
12.5 TABLET ORAL 2 TIMES DAILY WITH MEALS
Status: DISCONTINUED | OUTPATIENT
Start: 2017-12-08 | End: 2017-12-08 | Stop reason: HOSPADM

## 2017-12-08 RX ORDER — SODIUM CHLORIDE 9 MG/ML
INJECTION, SOLUTION INTRAVENOUS CONTINUOUS
Status: DISCONTINUED | OUTPATIENT
Start: 2017-12-08 | End: 2017-12-08 | Stop reason: HOSPADM

## 2017-12-08 RX ORDER — TRAMADOL HYDROCHLORIDE 50 MG/1
50 TABLET ORAL EVERY 6 HOURS PRN
Qty: 30 TABLET | Refills: 0 | Status: SHIPPED | OUTPATIENT
Start: 2017-12-08 | End: 2017-12-18

## 2017-12-08 RX ADMIN — SODIUM CHLORIDE: 0.9 INJECTION, SOLUTION INTRAVENOUS at 11:12

## 2017-12-08 RX ADMIN — SODIUM CHLORIDE: 0.9 INJECTION, SOLUTION INTRAVENOUS at 05:12

## 2017-12-08 RX ADMIN — SODIUM CHLORIDE 500 ML: 0.9 INJECTION, SOLUTION INTRAVENOUS at 04:12

## 2017-12-08 RX ADMIN — MAGNESIUM SULFATE IN WATER 2 G: 40 INJECTION, SOLUTION INTRAVENOUS at 12:12

## 2017-12-08 RX ADMIN — INSULIN ASPART 3 UNITS: 100 INJECTION, SOLUTION INTRAVENOUS; SUBCUTANEOUS at 12:12

## 2017-12-08 RX ADMIN — ATORVASTATIN CALCIUM 20 MG: 10 TABLET, FILM COATED ORAL at 11:12

## 2017-12-08 RX ADMIN — PANTOPRAZOLE SODIUM 40 MG: 40 TABLET, DELAYED RELEASE ORAL at 11:12

## 2017-12-08 RX ADMIN — LOSARTAN POTASSIUM 25 MG: 25 TABLET, FILM COATED ORAL at 11:12

## 2017-12-08 RX ADMIN — ONDANSETRON 8 MG: 2 INJECTION INTRAMUSCULAR; INTRAVENOUS at 09:12

## 2017-12-08 RX ADMIN — TRAMADOL HYDROCHLORIDE 50 MG: 50 TABLET, FILM COATED ORAL at 11:12

## 2017-12-08 RX ADMIN — ONDANSETRON 8 MG: 2 INJECTION INTRAMUSCULAR; INTRAVENOUS at 02:12

## 2017-12-08 RX ADMIN — CARVEDILOL 12.5 MG: 6.25 TABLET, FILM COATED ORAL at 11:12

## 2017-12-08 RX ADMIN — CIPROFLOXACIN HYDROCHLORIDE 500 MG: 500 TABLET, FILM COATED ORAL at 11:12

## 2017-12-08 RX ADMIN — ASPIRIN 81 MG: 81 TABLET, COATED ORAL at 11:12

## 2017-12-08 NOTE — PLAN OF CARE
12/08/17 1441   Final Note   Assessment Type Final Discharge Note   Discharge Disposition Home   Hospital Follow Up  Appt(s) scheduled? No  (Patient in town visiting with daughter for the holiday.)   Right Care Referral Info   Post Acute Recommendation No Care   Susi Castillo LMSW, [facility], [facility]  12/8/2017

## 2017-12-08 NOTE — HOSPITAL COURSE
Echo 12/7/17    1 - Normal left ventricular systolic function (EF 55-60%).     2 - Eccentric hypertrophy.     3 - Impaired LV relaxation, elevated LAP (grade 2 diastolic dysfunction).

## 2017-12-08 NOTE — ASSESSMENT & PLAN NOTE
This patient meets criteria for sepsis given fever, tachycardia, leukocytosis, suspected pyelonephritis, and minimally-elevated troponin.  Blood and urine cultures are pending; will start IV fluid hydration and broad spectrum antibiotics.  From E-coli  In urine.  Sepsis resolved.

## 2017-12-08 NOTE — NURSING
Pt Navjot Gonzalez MRN [id number] with tele box #[id number] confirmed and verified on tele box and monitor. Pt's HR elevated tonight up to 120s while pt in bed resting. Dr. Malloy notified. Coreg changed to 25mg BID, first dose now.     0120: HR down to 79. Pt resting appropriately, no more complaints of CP.

## 2017-12-08 NOTE — ASSESSMENT & PLAN NOTE
Patient is on a home regimen of metformin and a DPP-4 inhibitor; will provide basal-prandial insulin therapy along with insulin sliding scale.  No other apparent manifestations.

## 2017-12-08 NOTE — SUBJECTIVE & OBJECTIVE
Interval History:     Review of Systems   Constitution: Negative for decreased appetite.   HENT: Negative for ear discharge.    Eyes: Negative for blurred vision.   Respiratory: Negative for hemoptysis.    Endocrine: Negative for polyphagia.   Hematologic/Lymphatic: Negative for adenopathy.   Skin: Negative for color change and nail changes.   Musculoskeletal: Negative for joint swelling.   Neurological: Negative for aphonia and brief paralysis.   Psychiatric/Behavioral: Negative for hallucinations.     Objective:     Vital Signs (Most Recent):  Temp: 97.7 °F (36.5 °C) (12/08/17 0736)  Pulse: 77 (12/08/17 0736)  Resp: 17 (12/08/17 0736)  BP: 124/60 (12/08/17 0736)  SpO2: 97 % (12/08/17 0736) Vital Signs (24h Range):  Temp:  [97.7 °F (36.5 °C)-101 °F (38.3 °C)] 97.7 °F (36.5 °C)  Pulse:  [] 77  Resp:  [17-18] 17  SpO2:  [92 %-98 %] 97 %  BP: ()/(51-67) 124/60     Weight: 58 kg (127 lb 13.9 oz)  Body mass index is 27.67 kg/m².     SpO2: 97 %  O2 Device (Oxygen Therapy): room air      Intake/Output Summary (Last 24 hours) at 12/08/17 0817  Last data filed at 12/07/17 1700   Gross per 24 hour   Intake              240 ml   Output                0 ml   Net              240 ml       Lines/Drains/Airways     Peripheral Intravenous Line                 Peripheral IV - Single Lumen 12/06/17 1520 Left Hand 1 day         Peripheral IV - Single Lumen 12/06/17 1745 Right Antecubital 1 day                Physical Exam   Constitutional: She is oriented to person, place, and time. She appears well-developed and well-nourished.   HENT:   Head: Normocephalic and atraumatic.   Eyes: Conjunctivae and EOM are normal. Pupils are equal, round, and reactive to light.   Neck: Normal range of motion. Neck supple. No thyromegaly present.   Cardiovascular: Normal rate and regular rhythm.    No murmur heard.  Pulmonary/Chest: Effort normal and breath sounds normal. No respiratory distress.   Abdominal: Soft. Bowel sounds are normal.    Musculoskeletal: She exhibits no edema.   Neurological: She is alert and oriented to person, place, and time.   Skin: Skin is warm and dry.   Psychiatric: She has a normal mood and affect. Her behavior is normal.       Significant Labs: All pertinent lab results from the last 24 hours have been reviewed.    Significant Imaging: Echocardiogram:   2D echo with color flow doppler:   Results for orders placed or performed during the hospital encounter of 12/06/17   2D echo with color flow doppler   Result Value Ref Range    EF 55 55 - 65    Mitral Valve Regurgitation TRIVIAL TO MILD     Diastolic Dysfunction Yes (A)     Est. PA Systolic Pressure 32.81     Pericardial Effusion NONE     Tricuspid Valve Regurgitation MILD

## 2017-12-08 NOTE — DISCHARGE SUMMARY
[facility] Hospital Medicine  Discharge Summary      Patient Name: Navjot Gonzalez  MRN: [id number]  Admission Date: 12/6/2017  Hospital Length of Stay: 2 days  Discharge Date and Time:  12/08/2017 11:29 AM  Attending Physician: Sj Mckinnon MD   Discharging Provider: Sj Mckinnon MD  Primary Care Provider: Provider Notinsystem      HPI:   Ms. Navjot Gonzalez is a 77 y.o. female with essential hypertension, hyperlipidemia (LDL unknown), type 2 diabetes mellitus (HbA1c unknown), CAD, anemia of chronic disease, and GERD who presents to [facility] ED with complaints of abdominal pain this morning.  He describes the pain as left-lower abdominal quadrant without radiation and is associated with increased urinary frequency and pain.  She denies any fevers or chills.  Her appetite has been poor.    She does, however, complain of chest pain that is midsternal without radiation.  The pain is 5/10 in severity and is associated with increasing fatigue, shortness of breath, and dizziness.  She does report a history of 2-vessel coronary artery disease but it is unclear if she has ever had any intervention performed.  She was previously receiving her medical care in [place], [place].      * No surgery found *      Hospital Course:   The patient was admitted to the hospital for evaluation and treatment of a UTI/Pyeloneprhitis.  The patient was started on Abx. Blood cultures were negative. Urine culture grew out E-coli (S) to Cipro.  The patient was also complaining of CP with a known history of CAD. Her troponins were minimally bumped and cards was consulted. The patient went for nuclear stress on 12/8 that was negative. The rest of the hospital course was unremarkable and the patient felt much better and would like to go home.  Activity as tolerated. Diet- low NA. Follow up with PCP in one week.      Will go home on 12 more days of PO cipro.      Consults:   Consults         Status Ordering Provider     Inpatient  consult to Cardiology  Once     Provider:  Uri Gayle MD    Completed GERDA STEPHENSON          No new Assessment & Plan notes have been filed under this hospital service since the last note was generated.  Service: Hospital Medicine    Final Active Diagnoses:    Diagnosis Date Noted POA    PRINCIPAL PROBLEM:  Pyelonephritis, acute [N10] 12/06/2017 Yes    Essential hypertension [I10] 12/07/2017 Yes     Chronic    Hyperlipidemia [E78.5] 12/07/2017 Yes     Chronic    Type 2 diabetes mellitus [E11.9] 12/07/2017 Yes     Chronic    Anemia of chronic disease [D63.8] 12/07/2017 Yes     Chronic    GERD (gastroesophageal reflux disease) [K21.9] 12/07/2017 Yes     Chronic    CAD (coronary artery disease) [I25.10] 12/07/2017 Yes     Chronic      Problems Resolved During this Admission:    Diagnosis Date Noted Date Resolved POA    Severe sepsis [A41.9, R65.20] 12/07/2017 12/08/2017 Yes    Acute chest pain [R07.9] 12/07/2017 12/08/2017 Yes       Discharged Condition: good    Disposition: Home or Self Care    Follow Up:  Follow-up Information     Provider Notinsystem In 1 week.               Patient Instructions:     Diet Low Sodium, 2gm     Activity as tolerated         Significant Diagnostic Studies:     Pending Diagnostic Studies:     Procedure Component Value Units Date/Time    NM Myocardial Perfusion Spect Multi Pharmacologic [[id number]] Resulted:  12/08/17 0800    Order Status:  Sent Lab Status:  In process Updated:  12/08/17 1027    Troponin I [[id number]]     Order Status:  Sent Lab Status:  No result     Specimen:  Blood from Blood          Medications:  Reconciled Home Medications:   Current Discharge Medication List      START taking these medications    Details   ciprofloxacin HCl (CIPRO) 500 MG tablet Take 1 tablet (500 mg total) by mouth every 12 (twelve) hours.  Qty: 24 tablet, Refills: 0      traMADol (ULTRAM) 50 mg tablet Take 1 tablet (50 mg total) by mouth every 6 (six) hours as needed.  Qty:  30 tablet, Refills: 0         CONTINUE these medications which have NOT CHANGED    Details   aspirin (ECOTRIN) 81 MG EC tablet Take 81 mg by mouth once daily.      atorvastatin (LIPITOR) 20 MG tablet Take 20 mg by mouth once daily.      carvedilol (COREG) 3.125 MG tablet Take 3.125 mg by mouth 2 (two) times daily with meals.      esomeprazole (NEXIUM) 20 MG capsule Take 20 mg by mouth before breakfast.      losartan (COZAAR) 25 MG tablet Take 25 mg by mouth once daily.      SITagliptan-metformin (JANUMET)  mg per tablet Take 1 tablet by mouth 2 (two) times daily with meals.             Indwelling Lines/Drains at time of discharge:   Lines/Drains/Airways          No matching active lines, drains, or airways          Time spent on the discharge of patient: < 30   minutes  Patient was seen and examined on the date of discharge and determined to be suitable for discharge.         Sj Barker MD  Department of Hospital Medicine  [facility]

## 2017-12-08 NOTE — SUBJECTIVE & OBJECTIVE
Interval History: No new issues Going for stress test.    Review of Systems   Constitutional: Negative for activity change.   Respiratory: Negative for chest tightness and shortness of breath.    Cardiovascular: Positive for chest pain.   Gastrointestinal: Negative for abdominal distention, abdominal pain, nausea and vomiting.   Genitourinary: Positive for flank pain. Negative for difficulty urinating.     Objective:     Vital Signs (Most Recent):  Temp: 97.7 °F (36.5 °C) (12/08/17 0736)  Pulse: 77 (12/08/17 0736)  Resp: 17 (12/08/17 0736)  BP: 124/60 (12/08/17 0736)  SpO2: 97 % (12/08/17 0736) Vital Signs (24h Range):  Temp:  [97.7 °F (36.5 °C)-101 °F (38.3 °C)] 97.7 °F (36.5 °C)  Pulse:  [] 77  Resp:  [17-18] 17  SpO2:  [92 %-98 %] 97 %  BP: ()/(51-67) 124/60     Weight: 58 kg (127 lb 13.9 oz)  Body mass index is 27.67 kg/m².    Intake/Output Summary (Last 24 hours) at 12/08/17 0950  Last data filed at 12/07/17 1700   Gross per 24 hour   Intake              240 ml   Output                0 ml   Net              240 ml      Physical Exam   Constitutional: She is oriented to person, place, and time. She appears well-developed and well-nourished.   HENT:   Head: Normocephalic.   Cardiovascular: Normal rate.  Exam reveals no gallop.    Pulmonary/Chest: Breath sounds normal. No respiratory distress. She has no wheezes.   Neurological: She is alert and oriented to person, place, and time.   Skin: Skin is warm and dry.   Vitals reviewed.      Significant Labs:   BMP:   Recent Labs  Lab 12/07/17  0426   *      K 4.0      CO2 20*   BUN 11   CREATININE 0.8   CALCIUM 8.2*   MG 1.4*     CBC:   Recent Labs  Lab 12/06/17  1541 12/07/17  0426 12/08/17  0335   WBC 19.79* 22.59* 10.46   HGB 10.6* 9.3* 8.2*   HCT 30.8* 27.8* 24.5*    178 178       Significant Imaging

## 2017-12-08 NOTE — PROGRESS NOTES
Written Healthcare Instructions:    Follow-up Information     Provider Shraddhaystem In 1 week.           Please follow up.    Contact information:  Call your primary care doctor to schedule a hospital followup visit to be seen in 1 week.               [facility]  Unless otherwise directed by your provider, please   contact [facility], our nurse care line   that is available for 24/7 assistance.      [phone] (toll-free)      Registered nurses in the [facility]   provide: appointment scheduling, clinical advisement, health education, and other advisory services.    Thank you for choosing [facility] for your care.  Within 48-72 hours after leaving the hospital you will receive a call from [facility] nurses following up to see how you are doing.  The team will ask you a few questions and the call will last approximately 20 minutes.    Please answer any calls you may receive from [facility].  We want to continue to support you as you manage your healthcare needs.  [facility] is happy to have the opportunity to serve you.    Sincerely      Susi-  Your [facility]  [phone]

## 2017-12-08 NOTE — ASSESSMENT & PLAN NOTE
Ruling out for ACS but initial troponin is negative with nonischemic EKG  Echo with good EF  Stress test today

## 2017-12-08 NOTE — NURSING
"Pt nauseous and dry heaving overnight, states she feels dizzy. BP 92/51 89/52, HR 64. Dr. Malloy notified. 500cc bolus to be given and Coreg changed to 12.5mg.     0400: Pt's /53. Pt stating in Czech, "I'm very tired. I can't open my eyes and I wont to throw up when I do. Please call my daughter and let her know. I may not be here if you call her later."    Daughter's cell called. Voicemail left to update about pt's status.Tele box remains on. 500cc bolus still administering. Will cont to monitor.   "

## 2017-12-08 NOTE — HOSPITAL COURSE
The patient was admitted to the hospital for evaluation and treatment of a UTI/Pyeloneprhitis.  The patient was started on Abx. Blood cultures were negative. Urine culture grew out E-coli (S) to Cipro.  The patient was also complaining of CP with a known history of CAD. Her troponins were minimally bumped and cards was consulted. The patient went for nuclear stress on 12/8 that was negative. The rest of the hospital course was unremarkable and the patient felt much better and would like to go home.  Activity as tolerated. Diet- low NA. Follow up with PCP in one week.      Will go home on 12 more days of PO cipro.

## 2017-12-08 NOTE — PROGRESS NOTES
[facility] Hospital Medicine  Progress Note    Patient Name: Navjot Gonzalez  MRN: [id number]  Patient Class: IP- Inpatient   Admission Date: 12/6/2017  Length of Stay: 2 days  Attending Physician: jS Mckinnon MD  Primary Care Provider: Provider Notinsystem        Subjective:     Principal Problem:Pyelonephritis, acute    HPI:  Ms. Navjot Gonzalez is a 77 y.o. female with essential hypertension, hyperlipidemia (LDL unknown), type 2 diabetes mellitus (HbA1c unknown), CAD, anemia of chronic disease, and GERD who presents to [facility] ED with complaints of abdominal pain this morning.  He describes the pain as left-lower abdominal quadrant without radiation and is associated with increased urinary frequency and pain.  She denies any fevers or chills.  Her appetite has been poor.    She does, however, complain of chest pain that is midsternal without radiation.  The pain is 5/10 in severity and is associated with increasing fatigue, shortness of breath, and dizziness.  She does report a history of 2-vessel coronary artery disease but it is unclear if she has ever had any intervention performed.  She was previously receiving her medical care in [place], [place].      Hospital Course:  The patient was admitted to the hospital for evaluation and treatment of a UTI/Pyeloneprhitis.  The patient was started on Abx. Blood cultures were negative. Urine culture grew out E-coli (S) to Cipro.  The patient was also complaining of CP with a known history of CAD. Her troponins were minimally bumped and cards was consulted. The patient went for nuclear stress on 12/8    Interval History: No new issues Going for stress test.    Review of Systems   Constitutional: Negative for activity change.   Respiratory: Negative for chest tightness and shortness of breath.    Cardiovascular: Positive for chest pain.   Gastrointestinal: Negative for abdominal distention, abdominal pain, nausea and vomiting.   Genitourinary: Positive for  flank pain. Negative for difficulty urinating.     Objective:     Vital Signs (Most Recent):  Temp: 97.7 °F (36.5 °C) (12/08/17 0736)  Pulse: 77 (12/08/17 0736)  Resp: 17 (12/08/17 0736)  BP: 124/60 (12/08/17 0736)  SpO2: 97 % (12/08/17 0736) Vital Signs (24h Range):  Temp:  [97.7 °F (36.5 °C)-101 °F (38.3 °C)] 97.7 °F (36.5 °C)  Pulse:  [] 77  Resp:  [17-18] 17  SpO2:  [92 %-98 %] 97 %  BP: ()/(51-67) 124/60     Weight: 58 kg (127 lb 13.9 oz)  Body mass index is 27.67 kg/m².    Intake/Output Summary (Last 24 hours) at 12/08/17 0950  Last data filed at 12/07/17 1700   Gross per 24 hour   Intake              240 ml   Output                0 ml   Net              240 ml      Physical Exam   Constitutional: She is oriented to person, place, and time. She appears well-developed and well-nourished.   HENT:   Head: Normocephalic.   Cardiovascular: Normal rate.  Exam reveals no gallop.    Pulmonary/Chest: Breath sounds normal. No respiratory distress. She has no wheezes.   Neurological: She is alert and oriented to person, place, and time.   Skin: Skin is warm and dry.   Vitals reviewed.      Significant Labs:   BMP:   Recent Labs  Lab 12/07/17  0426   *      K 4.0      CO2 20*   BUN 11   CREATININE 0.8   CALCIUM 8.2*   MG 1.4*     CBC:   Recent Labs  Lab 12/06/17  1541 12/07/17  0426 12/08/17  0335   WBC 19.79* 22.59* 10.46   HGB 10.6* 9.3* 8.2*   HCT 30.8* 27.8* 24.5*    178 178       Significant Imaging    Assessment/Plan:      * Pyelonephritis, acute    Patient's left-sided flank pain was concerning for acute diverticulitis, but given that her CT-abd/pelvis was mor indicative of left-sided pyelonephritis, and her urinalysis significant only for significant bacteriuria, will start treatment for pyelonephritis.  She does meet criteria for severe sepsis.   The patient [facility]  Will make change.         Acute chest pain    Her story of chest pain  is concerning especially given her reported history minimally elevated troponin.  She does complain of pain right now.  Will provide nitroglycerin, place on cardiac monitoring, and obtain serial cardiac markers.  I have reviewed the EKG and it reveals sinus tachycardia without evidence of acute ischemia.  Her chest pain and troponinemia could very well be due to sepsis, but will err on the side of caution. Minimal elevation in troponin in flat pattern. Doubt non-stemi. Cards ordered nuclear stress.        CAD (coronary artery disease)    As addressed above.  Will continue her home regimen of aspirin, atorvastatin, carvedilol, and losartan.        GERD (gastroesophageal reflux disease)    Will substitute her home regimen of esomeprazole for pantoprazole while she is inpatient.        Anemia of chronic disease    Unfortunately, we have no previous labwork to which to compare her anemia, but she is without obvious signs of bleeding; there is no indication for transfusion at this time.  Will continue to monitor.        Type 2 diabetes mellitus    Patient is on a home regimen of metformin and a DPP-4 inhibitor; will provide basal-prandial insulin therapy along with insulin sliding scale.  No other apparent manifestations.         Hyperlipidemia    Will continue her home regimen of atorvastatin.        Essential hypertension    Patient's blood pressure is well-controlled; will continue home regimen of carvedilol and losartan, and provide as-needed clonidine.        Severe sepsis    This patient meets criteria for sepsis given fever, tachycardia, leukocytosis, suspected pyelonephritis, and minimally-elevated troponin.  Blood and urine cultures are pending; will start IV fluid hydration and broad spectrum antibiotics.  From E-coli  In urine.  Sepsis resolved.           VTE Risk Mitigation         Ordered     Medium Risk of VTE  Once      12/06/17 2209        Stress test today. If negative- will likely d/c to home on Cipro  for 12 more days       Sj Barker MD  Department of Hospital Medicine   [facility]

## 2017-12-08 NOTE — ASSESSMENT & PLAN NOTE
Her story of chest pain is concerning especially given her reported history minimally elevated troponin.  She does complain of pain right now.  Will provide nitroglycerin, place on cardiac monitoring, and obtain serial cardiac markers.  I have reviewed the EKG and it reveals sinus tachycardia without evidence of acute ischemia.  Her chest pain and troponinemia could very well be due to sepsis, but will err on the side of caution. Minimal elevation in troponin in flat pattern. Doubt non-stemi. Cards ordered nuclear stress.

## 2017-12-08 NOTE — ASSESSMENT & PLAN NOTE
Patient's left-sided flank pain was concerning for acute diverticulitis, but given that her CT-abd/pelvis was mor indicative of left-sided pyelonephritis, and her urinalysis significant only for significant bacteriuria, will start treatment for pyelonephritis.  She does meet criteria for severe sepsis.   The patient grew out E-coli in urine cultures (S) to Cipro.  Will make change.

## 2017-12-08 NOTE — NURSING
Patient remained free from falls, trauma, and injuries throughout shift. No complaints of nausea or vomiting. Pain controlled with prn analgesics. IV fluids and medications administered as prescribed. IV removed; catheter intact. Discharge instructions given in Beninese, prescriptions, and future appointments given to patient and her daughter. Educated patient on reasons to return to hospital and provided handout on pyelonephritis in Beninese; patient's daughter verbalized understanding. Discharge via wheelchair accompanied by staff and family. No acute distress noted.

## 2017-12-08 NOTE — PROGRESS NOTES
[facility]  Cardiology  Progress Note    Patient Name: Navjot Gonzalez  MRN: [id number]  Admission Date: 12/6/2017  Hospital Length of Stay: 2 days  Code Status: Full Code   Attending Physician: Sj Mckinnon MD   Primary Care Physician: Provider Notinsystem  Expected Discharge Date:   Principal Problem:Pyelonephritis, acute    Subjective:     Hospital Course:     Echo 12/7/17    1 - Normal left ventricular systolic function (EF 55-60%).     2 - Eccentric hypertrophy.     3 - Impaired LV relaxation, elevated LAP (grade 2 diastolic dysfunction).     Interval History:     Review of Systems   Constitution: Negative for decreased appetite.   HENT: Negative for ear discharge.    Eyes: Negative for blurred vision.   Respiratory: Negative for hemoptysis.    Endocrine: Negative for polyphagia.   Hematologic/Lymphatic: Negative for adenopathy.   Skin: Negative for color change and nail changes.   Musculoskeletal: Negative for joint swelling.   Neurological: Negative for aphonia and brief paralysis.   Psychiatric/Behavioral: Negative for hallucinations.     Objective:     Vital Signs (Most Recent):  Temp: 97.7 °F (36.5 °C) (12/08/17 0736)  Pulse: 77 (12/08/17 0736)  Resp: 17 (12/08/17 0736)  BP: 124/60 (12/08/17 0736)  SpO2: 97 % (12/08/17 0736) Vital Signs (24h Range):  Temp:  [97.7 °F (36.5 °C)-101 °F (38.3 °C)] 97.7 °F (36.5 °C)  Pulse:  [] 77  Resp:  [17-18] 17  SpO2:  [92 %-98 %] 97 %  BP: ()/(51-67) 124/60     Weight: 58 kg (127 lb 13.9 oz)  Body mass index is 27.67 kg/m².     SpO2: 97 %  O2 Device (Oxygen Therapy): room air      Intake/Output Summary (Last 24 hours) at 12/08/17 0817  Last data filed at 12/07/17 1700   Gross per 24 hour   Intake              240 ml   Output                0 ml   Net              240 ml       Lines/Drains/Airways     Peripheral Intravenous Line                 Peripheral IV - Single Lumen 12/06/17 1520 Left Hand 1 day         Peripheral IV - Single Lumen  12/06/17 1745 Right Antecubital 1 day                Physical Exam   Constitutional: She is oriented to person, place, and time. She appears well-developed and well-nourished.   HENT:   Head: Normocephalic and atraumatic.   Eyes: Conjunctivae and EOM are normal. Pupils are equal, round, and reactive to light.   Neck: Normal range of motion. Neck supple. No thyromegaly present.   Cardiovascular: Normal rate and regular rhythm.    No murmur heard.  Pulmonary/Chest: Effort normal and breath sounds normal. No respiratory distress.   Abdominal: Soft. Bowel sounds are normal.   Musculoskeletal: She exhibits no edema.   Neurological: She is alert and oriented to person, place, and time.   Skin: Skin is warm and dry.   Psychiatric: She has a normal mood and affect. Her behavior is normal.       Significant Labs: All pertinent lab results from the last 24 hours have been reviewed.    Significant Imaging: Echocardiogram:   2D echo with color flow doppler:   Results for orders placed or performed during the hospital encounter of 12/06/17   2D echo with color flow doppler   Result Value Ref Range    EF 55 55 - 65    Mitral Valve Regurgitation TRIVIAL TO MILD     Diastolic Dysfunction Yes (A)     Est. PA Systolic Pressure 32.81     Pericardial Effusion NONE     Tricuspid Valve Regurgitation MILD      Assessment and Plan:     Brief HPI:     * Pyelonephritis, acute    On antibiotics per primary        Acute chest pain    Ruling out for ACS but initial troponin is negative with nonischemic EKG  Echo with good EF  Stress test today        CAD (coronary artery disease)    Prior history of PCI  Continue medicines for secondary prevention        Type 2 diabetes mellitus    Per IM        Hyperlipidemia    On statin        Essential hypertension                 VTE Risk Mitigation         Ordered     Medium Risk of VTE  Once      12/06/17 2275          Andrez Villanueva MD  Cardiology  [facility]    Stress test  12/8/17  LVEF: 42 %  Impression: NORMAL MYOCARDIAL PERFUSION  1. The perfusion scan is free of evidence for myocardial ischemia or injury.   2. Resting wall motion is physiologic.   3. There is resting LV dysfunction with a reduced ejection fraction of 42 %.   4. The ventricular volumes are normal at rest and stress.   5. The extracardiac distribution of radioactivity is normal.     Will f/u prn

## 2017-12-08 NOTE — NURSING
Patient off unit to [employer] via wheelchair. Monitor tech Chante notified. No acute distress noted.

## 2017-12-08 NOTE — PLAN OF CARE
Problem: Patient Care Overview  Goal: Plan of Care Review  Outcome: Ongoing (interventions implemented as appropriate)  Pt remains free of falls and injuries. Vitals now stable (see previous note). Pt feeling better, less nauseous. Pain managed with PRN pain meds. BG managed with scheduled Levemir. Assisted with restroom as needed. IVFs cont. NPO for stress test today.

## 2017-12-11 LAB
BACTERIA BLD CULT: NORMAL
BACTERIA BLD CULT: NORMAL

## 2020-08-05 ENCOUNTER — TELEPHONE ENCOUNTER (OUTPATIENT)
Dept: URBAN - METROPOLITAN AREA CLINIC 54 | Facility: CLINIC | Age: 80
End: 2020-08-05

## 2020-08-05 RX ORDER — PANTOPRAZOLE SODIUM 20 MG
TAKE 1 TABLET BY ORAL ROUTE 2 TIMES A DAY FOR 30 DAYS TABLET, DELAYED RELEASE (ENTERIC COATED) ORAL 2
Qty: 60 | Refills: 1
Start: 2018-01-15

## 2020-08-21 ENCOUNTER — ERX REFILL RESPONSE (OUTPATIENT)
Dept: URBAN - METROPOLITAN AREA CLINIC 54 | Facility: CLINIC | Age: 80
End: 2020-08-21

## 2020-08-21 RX ORDER — PANTOPRAZOLE SODIUM 20 MG/1
TAKE 1 TABLET BY ORAL ROUTE 2 TIMES A DAY FOR 30 DAYS TABLET, DELAYED RELEASE ORAL 2
Qty: 60 | Refills: 1 | OUTPATIENT

## 2020-08-21 RX ORDER — PANTOPRAZOLE SODIUM 20 MG/1
TAKE 1 TABLET BY MOUTH TWICE A DAY FOR 30 DAYS TABLET, DELAYED RELEASE ORAL
Qty: 180 TABLET | Refills: 1 | OUTPATIENT

## 2020-11-02 ENCOUNTER — DASHBOARD ENCOUNTERS (OUTPATIENT)
Age: 80
End: 2020-11-02

## 2020-11-02 ENCOUNTER — OFFICE VISIT (OUTPATIENT)
Dept: URBAN - METROPOLITAN AREA CLINIC 54 | Facility: CLINIC | Age: 80
End: 2020-11-02
Payer: MEDICARE

## 2020-11-02 ENCOUNTER — WEB ENCOUNTER (OUTPATIENT)
Dept: URBAN - METROPOLITAN AREA CLINIC 54 | Facility: CLINIC | Age: 80
End: 2020-11-02

## 2020-11-02 DIAGNOSIS — R10.33 PERIUMBILICAL ABDOMINAL PAIN: ICD-10-CM

## 2020-11-02 DIAGNOSIS — C95.90 LEUKEMIA: ICD-10-CM

## 2020-11-02 DIAGNOSIS — Z12.11 ENCOUNTER FOR SCREENING COLONOSCOPY: ICD-10-CM

## 2020-11-02 PROBLEM — 1162768007 LEUKEMIA: Status: ACTIVE | Noted: 2020-11-02

## 2020-11-02 PROCEDURE — G8427 DOCREV CUR MEDS BY ELIG CLIN: HCPCS | Performed by: INTERNAL MEDICINE

## 2020-11-02 PROCEDURE — 99214 OFFICE O/P EST MOD 30 MIN: CPT | Performed by: INTERNAL MEDICINE

## 2020-11-02 PROCEDURE — G8482 FLU IMMUNIZE ORDER/ADMIN: HCPCS | Performed by: INTERNAL MEDICINE

## 2020-11-02 PROCEDURE — 1036F TOBACCO NON-USER: CPT | Performed by: INTERNAL MEDICINE

## 2020-11-02 PROCEDURE — G8417 CALC BMI ABV UP PARAM F/U: HCPCS | Performed by: INTERNAL MEDICINE

## 2020-11-02 RX ORDER — ESOMEPRAZOLE MAGNESIUM 40 MG
TAKE 1 CAPSULE (40 MG) BY ORAL ROUTE ONCE DAILY CAPSULE,DELAYED RELEASE (ENTERIC COATED) ORAL 1
Qty: 0 | Refills: 0 | Status: DISCONTINUED | COMMUNITY
Start: 1900-01-01

## 2020-11-02 RX ORDER — LORATADINE 10 MG/1
CAPSULE, LIQUID FILLED ORAL
Qty: 0 | Refills: 0 | Status: DISCONTINUED | COMMUNITY
Start: 1900-01-01

## 2020-11-02 RX ORDER — IBUPROFEN 600 MG/1
1 TABLET WITH FOOD OR MILK AS NEEDED TABLET ORAL THREE TIMES A DAY
Status: ACTIVE | COMMUNITY

## 2020-11-02 RX ORDER — CARVEDILOL 3.12 MG/1
TAKE 1 TABLET (3.125 MG) BY ORAL ROUTE 2 TIMES PER DAY WITH FOOD TABLET, FILM COATED ORAL 2
Qty: 0 | Refills: 0 | Status: ACTIVE | COMMUNITY
Start: 1900-01-01

## 2020-11-02 RX ORDER — ATORVASTATIN CALCIUM 10 MG/1
TAKE 1 TABLET (10 MG) BY ORAL ROUTE ONCE DAILY AT BEDTIME TABLET, FILM COATED ORAL 1
Qty: 0 | Refills: 0 | Status: ACTIVE | COMMUNITY
Start: 1900-01-01

## 2020-11-02 RX ORDER — PANTOPRAZOLE SODIUM 20 MG/1
TAKE 1 TABLET BY MOUTH TWICE A DAY FOR 30 DAYS TABLET, DELAYED RELEASE ORAL
Qty: 180 TABLET | Refills: 1 | Status: ACTIVE | COMMUNITY

## 2020-11-02 RX ORDER — SITAGLIPTIN AND METFORMIN HYDROCHLORIDE 50; 1000 MG/1; MG/1
TAKE 1 TABLET BY ORAL ROUTE 2 TIMES PER DAY WITH MEALS TABLET, FILM COATED ORAL 2
Qty: 0 | Refills: 0 | Status: ACTIVE | COMMUNITY
Start: 1900-01-01

## 2020-11-02 RX ORDER — TRAMADOL HYDROCHLORIDE 50 MG/1
TAKE 1 TABLET (50 MG) BY ORAL ROUTE EVERY 6 HOURS AS NEEDED TABLET ORAL
Qty: 0 | Refills: 0 | Status: DISCONTINUED | COMMUNITY
Start: 1900-01-01

## 2020-11-02 RX ORDER — DICYCLOMINE HYDROCHLORIDE 20 MG/1
1 TABLET TABLET ORAL THREE TIMES A DAY
Qty: 90 | OUTPATIENT
Start: 2020-11-02 | End: 2020-12-02

## 2020-11-02 RX ORDER — LOSARTAN POTASSIUM 25 MG/1
TAKE 1 TABLET (25 MG) BY ORAL ROUTE ONCE DAILY TABLET, FILM COATED ORAL 1
Qty: 0 | Refills: 0 | Status: ACTIVE | COMMUNITY
Start: 1900-01-01

## 2020-11-02 RX ORDER — AMOXICILLIN 250 MG/1
TAKE 1 CAPSULE (250 MG) BY ORAL ROUTE 3 TIMES PER DAY CAPSULE ORAL
Qty: 0 | Refills: 0 | Status: DISCONTINUED | COMMUNITY
Start: 1900-01-01

## 2020-11-02 NOTE — HPI-TODAY'S VISIT:
This is a follow-up appointment for this patient, a 77 year old  female, after a previous visit on 06/01/2015, for an evaluation for abdominal pain. The cause has been felt to be nonulcer dyspepsia with erosive gastritis and duodenitis. She reports that the pain has resolved on nexium bid. The patient admits to no other complaints.  The patient has had further work up which included the following studies: EGD with results noted in the chart..     Follow Up 1/15/2018: Patient returns for llq abdominal and back pain. Also c/o heartburn after she eats x 5 months. 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 She denies constipation and diarrhea. She claims to be taking Nexium BID with recurrence of symptoms. She was recently diagnosed with Acute pyelonephritis while she was visiting [place]. She has lost 15 lbs. No colonoscopy on file.  Follow Up 4/9/2018: Patient presents for routine follow up. She is asymptomatic at this time on acid suppression and antispasmodics. Colonoscopy was normal.   Follow Up 11/2/20: Patient presents for routine follow up. She has recurrence of abdominal pain, located mostly in periumbilical region without any alarm features. She denies constipation or diarrhea. She takes PPI for unclear reasons. She is being investigated by Cardiology for atypical chest pain and has a MPI scheduled. USG abdomen is unremarkable. She has gained 10 lbs since last visit.

## 2020-11-29 ENCOUNTER — ERX REFILL RESPONSE (OUTPATIENT)
Dept: URBAN - METROPOLITAN AREA CLINIC 54 | Facility: CLINIC | Age: 80
End: 2020-11-29

## 2020-11-29 RX ORDER — DICYCLOMINE HYDROCHLORIDE 20 MG/1
1 TABLET TABLET ORAL THREE TIMES A DAY
Qty: 90 | Refills: 0

## 2020-12-31 ENCOUNTER — ERX REFILL RESPONSE (OUTPATIENT)
Dept: URBAN - METROPOLITAN AREA CLINIC 54 | Facility: CLINIC | Age: 80
End: 2020-12-31

## 2020-12-31 RX ORDER — DICYCLOMINE HYDROCHLORIDE 20 MG/1
1 TABLET TABLET ORAL THREE TIMES A DAY
Qty: 90 | Refills: 0

## 2021-01-31 ENCOUNTER — ERX REFILL RESPONSE (OUTPATIENT)
Dept: URBAN - METROPOLITAN AREA CLINIC 54 | Facility: CLINIC | Age: 81
End: 2021-01-31

## 2021-01-31 RX ORDER — DICYCLOMINE HYDROCHLORIDE 20 MG/1
1 TABLET TABLET ORAL THREE TIMES A DAY
Qty: 90 | Refills: 0

## 2021-02-08 ENCOUNTER — ERX REFILL RESPONSE (OUTPATIENT)
Dept: URBAN - METROPOLITAN AREA CLINIC 54 | Facility: CLINIC | Age: 81
End: 2021-02-08

## 2021-02-08 RX ORDER — DICYCLOMINE HYDROCHLORIDE 20 MG/1
1 TABLET TABLET ORAL THREE TIMES A DAY
Qty: 90 | Refills: 0 | OUTPATIENT

## 2021-02-08 RX ORDER — DICYCLOMINE HYDROCHLORIDE 20 MG/1
TAKE 1 TABLET BY MOUTH THREE TIMES A DAY TABLET ORAL
Qty: 270 TABLET | Refills: 0 | OUTPATIENT

## 2021-02-15 ENCOUNTER — ERX REFILL RESPONSE (OUTPATIENT)
Dept: URBAN - METROPOLITAN AREA CLINIC 54 | Facility: CLINIC | Age: 81
End: 2021-02-15

## 2021-02-15 RX ORDER — PANTOPRAZOLE SODIUM 20 MG/1
TAKE 1 TABLET BY MOUTH TWICE A DAY FOR 30 DAYS TABLET, DELAYED RELEASE ORAL
Qty: 180 | Refills: 1

## 2021-03-09 ENCOUNTER — OUT OF OFFICE VISIT (OUTPATIENT)
Dept: URBAN - METROPOLITAN AREA MEDICAL CENTER 23 | Facility: MEDICAL CENTER | Age: 81
End: 2021-03-09
Payer: MEDICARE

## 2021-03-09 DIAGNOSIS — K62.5 ANAL BLEEDING: ICD-10-CM

## 2021-03-09 DIAGNOSIS — R19.7 ACUTE DIARRHEA: ICD-10-CM

## 2021-03-09 DIAGNOSIS — R93.3 ABN FINDINGS-GI TRACT: ICD-10-CM

## 2021-03-09 DIAGNOSIS — R10.84 ABDOMINAL CRAMPING, GENERALIZED: ICD-10-CM

## 2021-03-09 PROCEDURE — G8427 DOCREV CUR MEDS BY ELIG CLIN: HCPCS | Performed by: INTERNAL MEDICINE

## 2021-03-09 PROCEDURE — 99222 1ST HOSP IP/OBS MODERATE 55: CPT | Performed by: INTERNAL MEDICINE

## 2021-03-10 ENCOUNTER — OUT OF OFFICE VISIT (OUTPATIENT)
Dept: URBAN - METROPOLITAN AREA MEDICAL CENTER 23 | Facility: MEDICAL CENTER | Age: 81
End: 2021-03-10
Payer: MEDICARE

## 2021-03-10 DIAGNOSIS — K63.89 BACTERIAL OVERGROWTH SYNDROME: ICD-10-CM

## 2021-03-10 DIAGNOSIS — R10.84 ABDOMINAL CRAMPING, GENERALIZED: ICD-10-CM

## 2021-03-10 DIAGNOSIS — D72.829 ELEVATED WBC COUNT: ICD-10-CM

## 2021-03-10 DIAGNOSIS — K62.5 ANAL BLEEDING: ICD-10-CM

## 2021-03-10 PROCEDURE — 99232 SBSQ HOSP IP/OBS MODERATE 35: CPT | Performed by: INTERNAL MEDICINE

## 2021-03-12 ENCOUNTER — OUT OF OFFICE VISIT (OUTPATIENT)
Dept: URBAN - METROPOLITAN AREA MEDICAL CENTER 23 | Facility: MEDICAL CENTER | Age: 81
End: 2021-03-12
Payer: MEDICARE

## 2021-03-12 DIAGNOSIS — K92.1 BLACK STOOL: ICD-10-CM

## 2021-03-12 DIAGNOSIS — K55.9 ACUTE ISCHEMIC COLITIS IMPROVING: ICD-10-CM

## 2021-03-12 DIAGNOSIS — R93.3 ABN FINDINGS-GI TRACT: ICD-10-CM

## 2021-03-12 PROCEDURE — 45380 COLONOSCOPY AND BIOPSY: CPT | Performed by: INTERNAL MEDICINE

## 2021-08-16 ENCOUNTER — ERX REFILL RESPONSE (OUTPATIENT)
Dept: URBAN - METROPOLITAN AREA CLINIC 54 | Facility: CLINIC | Age: 81
End: 2021-08-16

## 2021-08-16 RX ORDER — DICYCLOMINE HYDROCHLORIDE 20 MG/1
TAKE 1 TABLET BY MOUTH THREE TIMES A DAY TABLET ORAL
Qty: 270 TABLET | Refills: 2 | OUTPATIENT

## 2021-08-16 RX ORDER — PANTOPRAZOLE SODIUM 20 MG/1
TAKE 1 TABLET BY MOUTH TWICE A DAY TABLET, DELAYED RELEASE ORAL
Qty: 180 TABLET | Refills: 2 | OUTPATIENT

## 2021-08-16 RX ORDER — PANTOPRAZOLE SODIUM 20 MG/1
TAKE 1 TABLET BY MOUTH TWICE A DAY FOR 30 DAYS TABLET, DELAYED RELEASE ORAL
Qty: 180 | Refills: 1 | OUTPATIENT

## 2022-02-07 ENCOUNTER — ERX REFILL RESPONSE (OUTPATIENT)
Dept: URBAN - METROPOLITAN AREA CLINIC 54 | Facility: CLINIC | Age: 82
End: 2022-02-07

## 2022-02-07 RX ORDER — PANTOPRAZOLE SODIUM 20 MG/1
TAKE 1 TABLET BY MOUTH TWICE A DAY TABLET, DELAYED RELEASE ORAL
Qty: 180 TABLET | Refills: 2 | OUTPATIENT

## 2022-02-07 RX ORDER — PANTOPRAZOLE SODIUM 20 MG/1
TAKE 1 TABLET BY MOUTH TWICE A DAY 90 TABLET, DELAYED RELEASE ORAL
Qty: 180 TABLET | Refills: 2 | OUTPATIENT

## 2023-04-10 ENCOUNTER — ERX REFILL RESPONSE (OUTPATIENT)
Dept: URBAN - METROPOLITAN AREA CLINIC 54 | Facility: CLINIC | Age: 83
End: 2023-04-10

## 2023-04-10 RX ORDER — PANTOPRAZOLE SODIUM 20 MG/1
TAKE 1 TABLET BY MOUTH TWICE A DAY TABLET, DELAYED RELEASE ORAL
Qty: 180 TABLET | Refills: 1 | OUTPATIENT

## 2023-04-10 RX ORDER — PANTOPRAZOLE SODIUM 20 MG/1
TAKE 1 TABLET BY MOUTH TWICE A DAY 90 TABLET, DELAYED RELEASE ORAL
Qty: 180 TABLET | Refills: 2 | OUTPATIENT

## 2023-10-18 ENCOUNTER — ERX REFILL RESPONSE (OUTPATIENT)
Dept: URBAN - METROPOLITAN AREA CLINIC 54 | Facility: CLINIC | Age: 83
End: 2023-10-18

## 2023-10-18 RX ORDER — PANTOPRAZOLE SODIUM 20 MG/1
TAKE 1 TABLET BY MOUTH TWICE A DAY TABLET, DELAYED RELEASE ORAL
Qty: 180 TABLET | Refills: 1 | OUTPATIENT

## 2024-08-15 ENCOUNTER — ERX REFILL RESPONSE (OUTPATIENT)
Dept: URBAN - METROPOLITAN AREA CLINIC 54 | Facility: CLINIC | Age: 84
End: 2024-08-15

## 2024-08-15 RX ORDER — PANTOPRAZOLE SODIUM 20 MG/1
TAKE 1 TABLET BY MOUTH TWICE A DAY TABLET, DELAYED RELEASE ORAL
Qty: 180 TABLET | Refills: 1 | OUTPATIENT

## 2025-04-13 ENCOUNTER — ERX REFILL RESPONSE (OUTPATIENT)
Dept: URBAN - METROPOLITAN AREA CLINIC 54 | Facility: CLINIC | Age: 85
End: 2025-04-13

## 2025-04-13 RX ORDER — PANTOPRAZOLE SODIUM 20 MG/1
TAKE 1 TABLET BY MOUTH TWICE A DAY TABLET, DELAYED RELEASE ORAL
Qty: 180 TABLET | Refills: 1 | OUTPATIENT